# Patient Record
Sex: FEMALE | Race: WHITE | NOT HISPANIC OR LATINO | Employment: FULL TIME | URBAN - METROPOLITAN AREA
[De-identification: names, ages, dates, MRNs, and addresses within clinical notes are randomized per-mention and may not be internally consistent; named-entity substitution may affect disease eponyms.]

---

## 2017-09-30 ENCOUNTER — COMMUNICATION - HEALTHEAST (OUTPATIENT)
Dept: OBGYN | Facility: HOSPITAL | Age: 32
End: 2017-09-30

## 2017-10-02 ENCOUNTER — RECORDS - HEALTHEAST (OUTPATIENT)
Dept: ADMINISTRATIVE | Facility: OTHER | Age: 32
End: 2017-10-02

## 2017-10-02 ENCOUNTER — ANESTHESIA - HEALTHEAST (OUTPATIENT)
Dept: OBGYN | Facility: HOSPITAL | Age: 32
End: 2017-10-02

## 2018-01-22 ENCOUNTER — OFFICE VISIT (OUTPATIENT)
Dept: FAMILY MEDICINE | Facility: CLINIC | Age: 33
End: 2018-01-22
Payer: COMMERCIAL

## 2018-01-22 DIAGNOSIS — R50.9 FEVER, UNSPECIFIED FEVER CAUSE: Primary | ICD-10-CM

## 2018-01-22 DIAGNOSIS — B34.9 VIRAL SYNDROME: ICD-10-CM

## 2018-01-22 LAB
DEPRECATED S PYO AG THROAT QL EIA: NORMAL
FLUAV+FLUBV AG SPEC QL: NEGATIVE
FLUAV+FLUBV AG SPEC QL: NEGATIVE
SPECIMEN SOURCE: NORMAL
SPECIMEN SOURCE: NORMAL

## 2018-01-22 PROCEDURE — 99203 OFFICE O/P NEW LOW 30 MIN: CPT | Performed by: NURSE PRACTITIONER

## 2018-01-22 PROCEDURE — 87880 STREP A ASSAY W/OPTIC: CPT | Performed by: NURSE PRACTITIONER

## 2018-01-22 PROCEDURE — 87081 CULTURE SCREEN ONLY: CPT | Performed by: NURSE PRACTITIONER

## 2018-01-22 PROCEDURE — 87804 INFLUENZA ASSAY W/OPTIC: CPT | Performed by: NURSE PRACTITIONER

## 2018-01-22 ASSESSMENT — MIFFLIN-ST. JEOR: SCORE: 1852.13

## 2018-01-22 NOTE — LETTER
January 24, 2018      Shameka Maurice  [address] [address] [address]        Dear Shameka,         This letter is to inform you that the results of your recent throat culture are negative.  If you have any questions please call or make an appointment.          Sincerely,        ZAINAB Brown CNP

## 2018-01-22 NOTE — NURSING NOTE
"Chief Complaint   Patient presents with     Fever       Initial /79  Pulse 111  Temp 101.1  F (38.4  C) (Tympanic)  Wt 247 lb (112 kg)  SpO2 99%  BMI 39.87 kg/m2 Estimated body mass index is 39.87 kg/(m^2) as calculated from the following:    Height as of 5/3/14: 5' 6\" (1.676 m).    Weight as of this encounter: 247 lb (112 kg).  Medication Reconciliation: complete    Health Maintenance that is potentially due pending provider review:  NONE    n/a    Is there anyone who you would like to be able to receive your results? No  If yes have patient fill out CHRISTA      "

## 2018-01-22 NOTE — PATIENT INSTRUCTIONS
"Strep culture is pending will result in 48 hours.  If it is positive and change in treatment plan will contact you.      Symptomatic treatment with fluids, rest.  May use acetaminophen, ibuprofen prn.  RTC if any worsening symptoms or if not improving.   May return to work/school after 24 hours fever free.    Follow-up with your primary care provider next week and as needed.    Indications for emergent return to emergency department discussed with patient, who verbalized good understanding and agreement.  Patient understands the limitations of today's evaluation.         Viral Syndrome (Adult)  A viral illness may cause a number of symptoms. The symptoms depend on the part of the body that the virus affects. If it settles in your nose, throat, and lungs, it may cause cough, sore throat, congestion, and sometimes headache. If it settles in your stomach and intestinal tract, it may cause vomiting and diarrhea. Sometimes it causes vague symptoms like \"aching all over,\" feeling tired, loss of appetite, or fever.  A viral illness usually lasts 1 to 2 weeks, but sometimes it lasts longer. In some cases, a more serious infection can look like a viral syndrome in the first few days of the illness. You may need another exam and additional tests to know the difference. Watch for the warning signs listed below.  Home care  Follow these guidelines for taking care of yourself at home:    If symptoms are severe, rest at home for the first 2 to 3 days.    Stay away from cigarette smoke - both your smoke and the smoke from others.    You may use over-the-counter acetaminophen or ibuprofen for fever, muscle aching, and headache, unless another medicine was prescribed for this. If you have chronic liver or kidney disease or ever had a stomach ulcer or GI bleeding, talk with your doctor before using these medicines. No one who is younger than 18 and ill with a fever should take aspirin. It may cause severe disease or death.    Your " appetite may be poor, so a light diet is fine. Avoid dehydration by drinking 8 to 12 8-ounce glasses of fluids each day. This may include water; orange juice; lemonade; apple, grape, and cranberry juice; clear fruit drinks; electrolyte replacement and sports drinks; and decaffeinated teas and coffee. If you have been diagnosed with a kidney disease, ask your doctor how much and what types of fluids you should drink to prevent dehydration. If you have kidney disease, drinking too much fluid can cause it build up in the your body and be dangerous to your health.    Over-the-counter remedies won't shorten the length of the illness but may be helpful for cough, sore throat; and nasal and sinus congestion. Don't use decongestants if you have high blood pressure.  Follow-up care  Follow up with your healthcare provider if you do not improve over the next week.  Call 911  Get emergency medical care if any of the following occur:    Convulsion    Feeling weak, dizzy, or like you are going to faint    Chest pain, shortness of breath, wheezing, or difficulty breathing  When to seek medical advice  Call your healthcare provider right away if any of these occur:    Cough with lots of colored sputum (mucus) or blood in your sputum    Chest pain, shortness of breath, wheezing, or difficulty breathing    Severe headache; face, neck, or ear pain    Severe, constant pain in the lower right side of your belly (abdominal)    Continued vomiting (can t keep liquids down)    Frequent diarrhea (more than 5 times a day); blood (red or black color) or mucus in diarrhea    Feeling weak, dizzy, or like you are going to faint    Extreme thirst    Fever of 100.4 F (38 C) or higher, or as directed by your healthcare provider  Date Last Reviewed: 9/25/2015 2000-2017 [employer]. [address] [place] [address], [place] [address]. All rights reserved. This information is not intended as a substitute for professional medical care. Always  follow your healthcare professional's instructions.

## 2018-01-22 NOTE — PROGRESS NOTES
SUBJECTIVE:   Shameka Maurice is a 32 year old female who presents to clinic today for the following health issues:      Fever       Duration: today     Description (location/character/radiation): fever     Intensity:  moderate    Accompanying signs and symptoms: fever, chills, body aches, sore throat, vomiting, nausea, right ear pain, headache, no coughing or wheezing     History (similar episodes/previous evaluation): None    Precipitating or alleviating factors: None    Therapies tried and outcome: nyquil, dayquil      Problem list and histories reviewed & adjusted, as indicated.  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 No pertinent surgical history.    Social History   Substance Use Topics     Smoking status: Former Smoker     Packs/day: 0.25     Years: 15.00     Types: Cigarettes     Quit date: 8/13/2013     Smokeless tobacco: Never Used      Comment: 3-4 daily     Alcohol use Yes      Comment: occ.     Family History   Problem Relation Age of Onset     Breast Cancer Maternal Grandmother          Current Outpatient Prescriptions   Medication Sig Dispense Refill     clomiPHENE (CLOMID) 50 MG tablet Denied. Pt needs follow up appointment. (Patient not taking: Reported on 1/22/2018)       HYDROmorphone (DILAUDID) 2 MG tablet Take 1 tablet (2 mg) by mouth every 4 hours as needed for severe pain (Patient not taking: Reported on 1/22/2018) 6 tablet 0     ALPRAZolam (XANAX) 0.5 MG tablet Take 1 tablet by mouth twice daily as needed. (Patient not taking: Reported on 1/22/2018) 30 tablet 0     metFORMIN (GLUCOPHAGE) 500 MG tablet Take 1 tablet (500 mg) by mouth 3 times daily (with meals) (Patient not taking: Reported on 1/22/2018)  180 tablet 3     LORazepam (ATIVAN) 0.5 MG tablet Take 0.5-1 tablets (0.25-0.5 mg) by mouth every 8 hours as needed for anxiety Take 30 minutes prior to departure.  Do not operate a vehicle after taking this medication (Patient not taking: Reported on 1/22/2018) 4 tablet 0     citalopram (CELEXA) 20 MG tablet Take 1/2 tablet (10 mg) for 1-2 weeks, then increase to 1 tablet orally daily (Patient not taking: Reported on 1/22/2018) 30 tablet 4     Prenatal Vit-Fe Fumarate-FA (GNP PRENATAL VITAMINS) 28-0.8 MG TABS Take 1 tablet by mouth daily (Patient not taking: Reported on 1/22/2018) 120 tablet 4     Allergies   Allergen Reactions     Morphine Nausea and Vomiting     Septra [Bactrim] Rash         Reviewed and updated as needed this visit by clinical staffTobacco  Allergies  Meds  Med Hx  Surg Hx  Fam Hx  Soc Hx      Reviewed and updated as needed this visit by Provider         ROS:  Constitutional, HEENT, cardiovascular, pulmonary, gi and gu systems are negative, except as otherwise noted.      OBJECTIVE:   /79  Pulse 111  Temp 101.1  F (38.4  C) (Tympanic)  Wt 247 lb (112 kg)  SpO2 99%  BMI 39.87 kg/m2  Body mass index is 39.87 kg/(m^2).  GENERAL: healthy, alert and no distress  EYES: Eyes grossly normal to inspection, PERRL and conjunctivae and sclerae normal  HENT: ear canals and TM's normal, nose and mouth without ulcers or lesions  NECK: no adenopathy, no asymmetry, masses, or scars and thyroid normal to palpation  RESP: lungs clear to auscultation - no rales, rhonchi or wheezes  CV: regular rate and rhythm, normal S1 S2, no S3 or S4, no murmur, click or rub, no peripheral edema and peripheral pulses strong  MS: no gross musculoskeletal defects noted, no edema  SKIN: no suspicious lesions or rashes  NEURO: Normal strength and tone, mentation intact and speech normal  PSYCH: mentation appears normal, affect normal/bright  Results for orders placed or performed in visit on 01/22/18   Influenza A/B  "antigen   Result Value Ref Range    Influenza A/B Agn Specimen Nasal     Influenza A Negative NEG^Negative    Influenza B Negative NEG^Negative   Strep, Rapid Screen   Result Value Ref Range    Specimen Description Throat     Rapid Strep A Screen       NEGATIVE: No Group A streptococcal antigen detected by immunoassay, await culture report.         ASSESSMENT/PLAN:       ICD-10-CM    1. Fever, unspecified fever cause R50.9 Influenza A/B antigen     Strep, Rapid Screen     Beta strep group A culture   2. Viral syndrome B34.9      Patient Instructions   Strep culture is pending will result in 48 hours.  If it is positive and change in treatment plan will contact you.      Symptomatic treatment with fluids, rest.  May use acetaminophen, ibuprofen prn.  RTC if any worsening symptoms or if not improving.   May return to work/school after 24 hours fever free.    Follow-up with your primary care provider next week and as needed.    Indications for emergent return to emergency department discussed with patient, who verbalized good understanding and agreement.  Patient understands the limitations of today's evaluation.         Viral Syndrome (Adult)  A viral illness may cause a number of symptoms. The symptoms depend on the part of the body that the virus affects. If it settles in your nose, throat, and lungs, it may cause cough, sore throat, congestion, and sometimes headache. If it settles in your stomach and intestinal tract, it may cause vomiting and diarrhea. Sometimes it causes vague symptoms like \"aching all over,\" feeling tired, loss of appetite, or fever.  A viral illness usually lasts 1 to 2 weeks, but sometimes it lasts longer. In some cases, a more serious infection can look like a viral syndrome in the first few days of the illness. You may need another exam and additional tests to know the difference. Watch for the warning signs listed below.  Home care  Follow these guidelines for taking care of yourself at " home:    If symptoms are severe, rest at home for the first 2 to 3 days.    Stay away from cigarette smoke - both your smoke and the smoke from others.    You may use over-the-counter acetaminophen or ibuprofen for fever, muscle aching, and headache, unless another medicine was prescribed for this. If you have chronic liver or kidney disease or ever had a stomach ulcer or GI bleeding, talk with your doctor before using these medicines. No one who is younger than 18 and ill with a fever should take aspirin. It may cause severe disease or death.    Your appetite may be poor, so a light diet is fine. Avoid dehydration by drinking 8 to 12 8-ounce glasses of fluids each day. This may include water; orange juice; lemonade; apple, grape, and cranberry juice; clear fruit drinks; electrolyte replacement and sports drinks; and decaffeinated teas and coffee. If you have been diagnosed with a kidney disease, ask your doctor how much and what types of fluids you should drink to prevent dehydration. If you have kidney disease, drinking too much fluid can cause it build up in the your body and be dangerous to your health.    Over-the-counter remedies won't shorten the length of the illness but may be helpful for cough, sore throat; and nasal and sinus congestion. Don't use decongestants if you have high blood pressure.  Follow-up care  Follow up with your healthcare provider if you do not improve over the next week.  Call 911  Get emergency medical care if any of the following occur:    Convulsion    Feeling weak, dizzy, or like you are going to faint    Chest pain, shortness of breath, wheezing, or difficulty breathing  When to seek medical advice  Call your healthcare provider right away if any of these occur:    Cough with lots of colored sputum (mucus) or blood in your sputum    Chest pain, shortness of breath, wheezing, or difficulty breathing    Severe headache; face, neck, or ear pain    Severe, constant pain in the lower  right side of your belly (abdominal)    Continued vomiting (can t keep liquids down)    Frequent diarrhea (more than 5 times a day); blood (red or black color) or mucus in diarrhea    Feeling weak, dizzy, or like you are going to faint    Extreme thirst    Fever of 100.4 F (38 C) or higher, or as directed by your healthcare provider  Date Last Reviewed: 9/25/2015 2000-2017 [employer]. [address] [place] [address], [place] [address]. All rights reserved. This information is not intended as a substitute for professional medical care. Always follow your healthcare professional's instructions.            ZAINAB Brown [facility]

## 2018-01-22 NOTE — MR AVS SNAPSHOT
"              After Visit Summary   1/22/2018    Shameka Maurice    MRN: [id number]           Patient Information     Date Of Birth          1985        Visit Information        Provider Department      1/22/2018 4:40 PM Theresa Hoffman APRN [facility]        Today's Diagnoses     Fever, unspecified fever cause    -  1    Viral syndrome          Care Instructions    Strep culture is pending will result in 48 hours.  If it is positive and change in treatment plan will contact you.      Symptomatic treatment with fluids, rest.  May use acetaminophen, ibuprofen prn.  RTC if any worsening symptoms or if not improving.   May return to work/school after 24 hours fever free.    Follow-up with your primary care provider next week and as needed.    Indications for emergent return to emergency department discussed with patient, who verbalized good understanding and agreement.  Patient understands the limitations of today's evaluation.         Viral Syndrome (Adult)  A viral illness may cause a number of symptoms. The symptoms depend on the part of the body that the virus affects. If it settles in your nose, throat, and lungs, it may cause cough, sore throat, congestion, and sometimes headache. If it settles in your stomach and intestinal tract, it may cause vomiting and diarrhea. Sometimes it causes vague symptoms like \"aching all over,\" feeling tired, loss of appetite, or fever.  A viral illness usually lasts 1 to 2 weeks, but sometimes it lasts longer. In some cases, a more serious infection can look like a viral syndrome in the first few days of the illness. You may need another exam and additional tests to know the difference. Watch for the warning signs listed below.  Home care  Follow these guidelines for taking care of yourself at home:    If symptoms are severe, rest at home for the first 2 to 3 days.    Stay away from cigarette smoke - both your smoke and the smoke from " others.    You may use over-the-counter acetaminophen or ibuprofen for fever, muscle aching, and headache, unless another medicine was prescribed for this. If you have chronic liver or kidney disease or ever had a stomach ulcer or GI bleeding, talk with your doctor before using these medicines. No one who is younger than 18 and ill with a fever should take aspirin. It may cause severe disease or death.    Your appetite may be poor, so a light diet is fine. Avoid dehydration by drinking 8 to 12 8-ounce glasses of fluids each day. This may include water; orange juice; lemonade; apple, grape, and cranberry juice; clear fruit drinks; electrolyte replacement and sports drinks; and decaffeinated teas and coffee. If you have been diagnosed with a kidney disease, ask your doctor how much and what types of fluids you should drink to prevent dehydration. If you have kidney disease, drinking too much fluid can cause it build up in the your body and be dangerous to your health.    Over-the-counter remedies won't shorten the length of the illness but may be helpful for cough, sore throat; and nasal and sinus congestion. Don't use decongestants if you have high blood pressure.  Follow-up care  Follow up with your healthcare provider if you do not improve over the next week.  Call 911  Get emergency medical care if any of the following occur:    Convulsion    Feeling weak, dizzy, or like you are going to faint    Chest pain, shortness of breath, wheezing, or difficulty breathing  When to seek medical advice  Call your healthcare provider right away if any of these occur:    Cough with lots of colored sputum (mucus) or blood in your sputum    Chest pain, shortness of breath, wheezing, or difficulty breathing    Severe headache; face, neck, or ear pain    Severe, constant pain in the lower right side of your belly (abdominal)    Continued vomiting (can t keep liquids down)    Frequent diarrhea (more than 5 times a day); blood (red  "or black color) or mucus in diarrhea    Feeling weak, dizzy, or like you are going to faint    Extreme thirst    Fever of 100.4 F (38 C) or higher, or as directed by your healthcare provider  Date Last Reviewed: 2015-2017 [employer]. [address] [place] [address]. All rights reserved. This information is not intended as a substitute for professional medical care. Always follow your healthcare professional's instructions.                Follow-ups after your visit        Who to contact     If you have questions or need follow up information about today's clinic visit or your schedule please contact [facility] directly at [phone].  Normal or non-critical lab and imaging results will be communicated to you by MyChart, letter or phone within 4 business days after the clinic has received the results. If you do not hear from us within 7 days, please contact the clinic through "[employer]hart or phone. If you have a critical or abnormal lab result, we will notify you by phone as soon as possible.  Submit refill requests through [employer] or call your pharmacy and they will forward the refill request to us. Please allow 3 business days for your refill to be completed.          Additional Information About Your Visit        "[employer]hart Information     [employer] lets you send messages to your doctor, view your test results, renew your prescriptions, schedule appointments and more. To sign up, go to [email or web address] . Click on \"Log in\" on the left side of the screen, which will take you to the Welcome page. Then click on \"Sign up Now\" on the right side of the page.     You will be asked to enter the access code listed below, as well as some personal information. Please follow the directions to create your username and password.     Your access code is: [email or web address]  Expires: 2018  5:14 PM     Your access code will  in 90 days. If you need help or a new code, " please call your [facility] clinic or [phone].        Care EveryWhere ID     This is your Care EveryWhere ID. This could be used by other organizations to access your [facility] medical records  [id number]        Your Vitals Were     Pulse Temperature Pulse Oximetry BMI (Body Mass Index)          111 101.1  F (38.4  C) (Tympanic) 99% 39.87 kg/m2         Blood Pressure from Last 3 Encounters:   01/22/18 141/79   08/24/14 120/77   05/03/14 110/76    Weight from Last 3 Encounters:   01/22/18 247 lb (112 kg)   05/03/14 240 lb (108.9 kg)   02/06/14 245 lb 3.2 oz (111.2 kg)              We Performed the Following     Beta strep group A culture     Influenza A/B antigen     Strep, Rapid Screen        Primary Care Provider Fax #    Physician No Ref-Primary [phone]       No address on file        Equal Access to Services     [facility] : Hadii felicitas Ramírez, malina neal, jovany still, kate bauman . So [facility] [phone].    ATENCIÓN: Si habla español, tiene a sanchez disposición servicios gratuitos de asistencia lingüística. Janine al [phone].    We comply with applicable federal civil rights laws and [place] laws. We do not discriminate on the basis of race, color, national origin, age, disability, sex, sexual orientation, or gender identity.            Thank you!     Thank you for choosing [facility]  for your care. Our goal is always to provide you with excellent care. Hearing back from our patients is one way we can continue to improve our services. Please take a few minutes to complete the written survey that you may receive in the mail after your visit with us. Thank you!             Your Updated Medication List - Protect others around you: Learn how to safely use, store and throw away your medicines at www.disposemymeds.org.          This list is accurate as of: 1/22/18  5:14 PM.  Always use your most recent med list.                    Brand Name Dispense Instructions for use Diagnosis    ALPRAZolam 0.5 MG tablet    XANAX    30 tablet    Take 1 tablet by mouth twice daily as needed.    CHRIS (generalised anxiety disorder)       citalopram 20 MG tablet    celeXA    30 tablet    Take 1/2 tablet (10 mg) for 1-2 weeks, then increase to 1 tablet orally daily    CHRIS (generalised anxiety disorder)       clomiPHENE 50 MG tablet    CLOMID     Denied. Pt needs follow up appointment.    Primary anovulatory infertility       GNP PRENATAL VITAMINS 28-0.8 MG Tabs     120 tablet    Take 1 tablet by mouth daily    Infertility associated with anovulation       HYDROmorphone 2 MG tablet    DILAUDID    6 tablet    Take 1 tablet (2 mg) by mouth every 4 hours as needed for severe pain        LORazepam 0.5 MG tablet    ATIVAN    4 tablet    Take 0.5-1 tablets (0.25-0.5 mg) by mouth every 8 hours as needed for anxiety Take 30 minutes prior to departure.  Do not operate a vehicle after taking this medication    Anxiety, Primary anovulatory infertility       metFORMIN 500 MG tablet    GLUCOPHAGE    180 tablet    Take 1 tablet (500 mg) by mouth 3 times daily (with meals)    Infertility associated with anovulation, PCOS (polycystic ovarian syndrome)

## 2018-01-23 LAB
BACTERIA SPEC CULT: NORMAL
SPECIMEN SOURCE: NORMAL

## 2018-04-05 ENCOUNTER — TELEPHONE (OUTPATIENT)
Dept: FAMILY MEDICINE | Facility: CLINIC | Age: 33
End: 2018-04-05

## 2018-04-05 NOTE — TELEPHONE ENCOUNTER
Reviewing charts. Patient is due for a physical with pap smear.    Called and spoke with patient. She declines to schedule her pap smear right now.    Moira Giordano MA

## 2018-09-25 ENCOUNTER — TELEPHONE (OUTPATIENT)
Dept: FAMILY MEDICINE | Facility: CLINIC | Age: 33
End: 2018-09-25

## 2018-09-25 NOTE — TELEPHONE ENCOUNTER
Reviewing charts. Patient is due for a physical with pap smear.    Tried calling patient. Phone number is for someone named Erin. Letter mailed.    Moira Giordano MA

## 2018-09-25 NOTE — LETTER
[facility]  [address] [place] [id number]  [phone]          September 25, 2018    Shameka Maurice                                                                                                                     [address] [facility] [address]            Dear Shameka Maurice,    At [facility] we care about your health and well-being. A review of your chart has indicated that you are due for a pap and physical exam. Please contact us at [phone] to schedule your appointment.    If you have already had one or all of the above screening tests at another facility, please call us to update your chart.     You may contact the clinic at [phone] if you have any questions or concerns about this request.    Sincerely,    [facility] Care Staff/ ss

## 2018-12-11 ENCOUNTER — TELEPHONE (OUTPATIENT)
Dept: FAMILY MEDICINE | Facility: CLINIC | Age: 33
End: 2018-12-11

## 2018-12-11 NOTE — LETTER
[facility]  [address] [place] [address]  [phone]      December 11, 2018    Shameka Maurice                                                                                                                     [id number] [facility] [address]            Dear Shameka Maurice,    At [facility] we care about your health and well-being. A review of your chart has indicated that you are due for a pap and physical exam. Please contact us at [phone] to schedule your appointment.    If you have already had one or all of the above screening tests at another facility, please call us to update your chart.     You may contact the clinic at [phone] if you have any questions or concerns about this request.    Sincerely,    [facility] Care Staff/ ss

## 2018-12-11 NOTE — TELEPHONE ENCOUNTER
Panel Management Review      Patient has the following on her problem list:     Hypertension   Last three blood pressure readings:  BP Readings from Last 3 Encounters:   01/22/18 141/79   08/24/14 120/77   05/03/14 110/76     Blood pressure: FAILED    HTN Guidelines:  Age 18-59 BP range:  Less than 140/90  Age 60-85 with Diabetes:  Less than 140/90  Age 60-85 without Diabetes:  less than 150/90      Composite cancer screening  Chart review shows that this patient is due/due soon for the following Pap Smear  Summary:    Patient is due/failing the following:   BP CHECK and PAP    Action needed:   Patient needs office visit for a physical with pap smear.    Type of outreach:    Tried calling patient. The only number listed is for someone named Erin. Letter mailed.    Questions for provider review:    None                                                                                                                                    Moira Giordano MA

## 2019-07-01 VITALS
HEART RATE: 111 BPM | OXYGEN SATURATION: 99 % | SYSTOLIC BLOOD PRESSURE: 141 MMHG | BODY MASS INDEX: 39.7 KG/M2 | WEIGHT: 247 LBS | HEIGHT: 66 IN | TEMPERATURE: 101.1 F | DIASTOLIC BLOOD PRESSURE: 79 MMHG

## 2019-07-19 ENCOUNTER — OFFICE VISIT - HEALTHEAST (OUTPATIENT)
Dept: FAMILY MEDICINE | Facility: CLINIC | Age: 34
End: 2019-07-19

## 2019-07-19 DIAGNOSIS — Z72.0 TOBACCO ABUSE: ICD-10-CM

## 2019-07-19 DIAGNOSIS — N89.8 VAGINAL DISCHARGE: ICD-10-CM

## 2019-07-19 DIAGNOSIS — Z00.01 ENCOUNTER FOR GENERAL ADULT MEDICAL EXAMINATION WITH ABNORMAL FINDINGS: ICD-10-CM

## 2019-07-19 DIAGNOSIS — E66.01 OBESITY, CLASS III, BMI 40-49.9 (MORBID OBESITY) (H): ICD-10-CM

## 2019-07-19 DIAGNOSIS — Z12.4 ENCOUNTER FOR SCREENING FOR CERVICAL CANCER: ICD-10-CM

## 2019-07-19 LAB
CLUE CELLS: NORMAL
TRICHOMONAS, WET PREP: NORMAL
YEAST, WET PREP: NORMAL

## 2019-07-19 ASSESSMENT — MIFFLIN-ST. JEOR: SCORE: 1964.83

## 2019-07-22 ENCOUNTER — COMMUNICATION - HEALTHEAST (OUTPATIENT)
Dept: FAMILY MEDICINE | Facility: CLINIC | Age: 34
End: 2019-07-22

## 2019-07-22 LAB
C TRACH DNA SPEC QL PROBE+SIG AMP: NEGATIVE
HPV SOURCE: NORMAL
HUMAN PAPILLOMA VIRUS 16 DNA: NEGATIVE
HUMAN PAPILLOMA VIRUS 18 DNA: NEGATIVE
HUMAN PAPILLOMA VIRUS FINAL DIAGNOSIS: NORMAL
HUMAN PAPILLOMA VIRUS OTHER HR: NEGATIVE
N GONORRHOEA DNA SPEC QL NAA+PROBE: NEGATIVE
SPECIMEN DESCRIPTION: NORMAL

## 2019-07-29 LAB
BKR LAB AP ABNORMAL BLEEDING: NO
BKR LAB AP BIRTH CONTROL/HORMONES: NORMAL
BKR LAB AP CERVICAL APPEARANCE: NORMAL
BKR LAB AP GYN ADEQUACY: NORMAL
BKR LAB AP GYN INTERPRETATION: NORMAL
BKR LAB AP HPV REFLEX: NORMAL
BKR LAB AP LMP: NORMAL
BKR LAB AP PATIENT STATUS: NORMAL
BKR LAB AP PREVIOUS ABNORMAL: NO
BKR LAB AP PREVIOUS NORMAL: 2013
HIGH RISK?: NO
PATH REPORT.COMMENTS IMP SPEC: NORMAL
RESULT FLAG (HE HISTORICAL CONVERSION): NORMAL

## 2019-08-07 ENCOUNTER — COMMUNICATION - HEALTHEAST (OUTPATIENT)
Dept: FAMILY MEDICINE | Facility: CLINIC | Age: 34
End: 2019-08-07

## 2019-08-07 ENCOUNTER — OFFICE VISIT - HEALTHEAST (OUTPATIENT)
Dept: FAMILY MEDICINE | Facility: CLINIC | Age: 34
End: 2019-08-07

## 2019-08-07 ENCOUNTER — HOSPITAL ENCOUNTER (OUTPATIENT)
Dept: LAB | Age: 34
Setting detail: SPECIMEN
Discharge: HOME | End: 2019-08-07

## 2019-08-07 DIAGNOSIS — E66.01 OBESITY, CLASS III, BMI 40-49.9 (MORBID OBESITY) (H): ICD-10-CM

## 2019-08-07 DIAGNOSIS — F41.9 ANXIETY: ICD-10-CM

## 2019-08-07 DIAGNOSIS — F32.A DEPRESSION, UNSPECIFIED DEPRESSION TYPE: ICD-10-CM

## 2019-08-07 DIAGNOSIS — N92.6 IRREGULAR MENSES: ICD-10-CM

## 2019-08-07 LAB
ALBUMIN SERPL-MCNC: 3.9 G/DL (ref 3.5–5)
ALP SERPL-CCNC: 32 U/L (ref 45–120)
ALT SERPL W P-5'-P-CCNC: 19 U/L (ref 0–45)
ANION GAP SERPL CALCULATED.3IONS-SCNC: 12 MMOL/L (ref 5–18)
AST SERPL W P-5'-P-CCNC: 19 U/L (ref 0–40)
ATRIAL RATE - MUSE: 57 BPM
BILIRUB SERPL-MCNC: 0.2 MG/DL (ref 0–1)
BUN SERPL-MCNC: 14 MG/DL (ref 8–22)
CALCIUM SERPL-MCNC: 9.6 MG/DL (ref 8.5–10.5)
CHLORIDE BLD-SCNC: 104 MMOL/L (ref 98–107)
CHOLEST SERPL-MCNC: 198 MG/DL
CO2 SERPL-SCNC: 23 MMOL/L (ref 22–31)
CREAT SERPL-MCNC: 0.74 MG/DL (ref 0.6–1.1)
DIASTOLIC BLOOD PRESSURE - MUSE: NORMAL MMHG
ERYTHROCYTE [DISTWIDTH] IN BLOOD BY AUTOMATED COUNT: 12.9 % (ref 11–14.5)
FASTING STATUS PATIENT QL REPORTED: YES
GFR SERPL CREATININE-BSD FRML MDRD: >60 ML/MIN/1.73M2
GLUCOSE BLD-MCNC: 78 MG/DL (ref 70–125)
HBA1C MFR BLD: 5.6 % (ref 3.5–6)
HCT VFR BLD AUTO: 44.1 % (ref 35–47)
HDLC SERPL-MCNC: 45 MG/DL
HGB BLD-MCNC: 14.4 G/DL (ref 12–16)
INTERPRETATION ECG - MUSE: NORMAL
LDLC SERPL CALC-MCNC: 123 MG/DL
MCH RBC QN AUTO: 28 PG (ref 27–34)
MCHC RBC AUTO-ENTMCNC: 32.6 G/DL (ref 32–36)
MCV RBC AUTO: 86 FL (ref 80–100)
P AXIS - MUSE: 47 DEGREES
PLATELET # BLD AUTO: 308 THOU/UL (ref 140–440)
PMV BLD AUTO: 8.1 FL (ref 7–10)
POTASSIUM BLD-SCNC: 4.7 MMOL/L (ref 3.5–5)
PR INTERVAL - MUSE: 156 MS
PROT SERPL-MCNC: 7 G/DL (ref 6–8)
QRS DURATION - MUSE: 90 MS
QT - MUSE: 394 MS
QTC - MUSE: 383 MS
R AXIS - MUSE: 54 DEGREES
RBC # BLD AUTO: 5.14 MILL/UL (ref 3.8–5.4)
SODIUM SERPL-SCNC: 139 MMOL/L (ref 136–145)
SYSTOLIC BLOOD PRESSURE - MUSE: NORMAL MMHG
T AXIS - MUSE: 47 DEGREES
TRIGL SERPL-MCNC: 148 MG/DL
TSH SERPL DL<=0.005 MIU/L-ACNC: 2.09 UIU/ML (ref 0.3–5)
VENTRICULAR RATE- MUSE: 57 BPM
WBC: 6.9 THOU/UL (ref 4–11)

## 2019-08-07 ASSESSMENT — MIFFLIN-ST. JEOR: SCORE: 1969.82

## 2019-08-08 ENCOUNTER — COMMUNICATION - HEALTHEAST (OUTPATIENT)
Dept: SURGERY | Facility: CLINIC | Age: 34
End: 2019-08-08

## 2019-08-08 LAB
25(OH)D3 SERPL-MCNC: 22.5 NG/ML (ref 30–80)
INSULIN SERPL-ACNC: 11.7 MU/L (ref 3–25)

## 2019-08-09 ENCOUNTER — COMMUNICATION - HEALTHEAST (OUTPATIENT)
Dept: FAMILY MEDICINE | Facility: CLINIC | Age: 34
End: 2019-08-09

## 2019-08-19 ENCOUNTER — COMMUNICATION - HEALTHEAST (OUTPATIENT)
Dept: FAMILY MEDICINE | Facility: CLINIC | Age: 34
End: 2019-08-19

## 2019-08-19 DIAGNOSIS — E66.01 OBESITY, CLASS III, BMI 40-49.9 (MORBID OBESITY) (H): ICD-10-CM

## 2019-09-10 ENCOUNTER — OFFICE VISIT - HEALTHEAST (OUTPATIENT)
Dept: SURGERY | Facility: CLINIC | Age: 34
End: 2019-09-10

## 2019-09-10 ENCOUNTER — AMBULATORY - HEALTHEAST (OUTPATIENT)
Dept: SURGERY | Facility: CLINIC | Age: 34
End: 2019-09-10

## 2019-09-10 ENCOUNTER — AMBULATORY - HEALTHEAST (OUTPATIENT)
Dept: LAB | Facility: CLINIC | Age: 34
End: 2019-09-10

## 2019-09-10 DIAGNOSIS — E66.01 MORBID OBESITY (H): ICD-10-CM

## 2019-09-10 DIAGNOSIS — N97.0 PRIMARY ANOVULATORY INFERTILITY: ICD-10-CM

## 2019-09-10 LAB
ALBUMIN SERPL-MCNC: 3.8 G/DL (ref 3.5–5)
ALP SERPL-CCNC: 43 U/L (ref 45–120)
ALT SERPL W P-5'-P-CCNC: 13 U/L (ref 0–45)
ANION GAP SERPL CALCULATED.3IONS-SCNC: 11 MMOL/L (ref 5–18)
AST SERPL W P-5'-P-CCNC: 12 U/L (ref 0–40)
BILIRUB SERPL-MCNC: 0.2 MG/DL (ref 0–1)
BUN SERPL-MCNC: 15 MG/DL (ref 8–22)
CALCIUM SERPL-MCNC: 9.7 MG/DL (ref 8.5–10.5)
CHLORIDE BLD-SCNC: 105 MMOL/L (ref 98–107)
CO2 SERPL-SCNC: 24 MMOL/L (ref 22–31)
CREAT SERPL-MCNC: 0.77 MG/DL (ref 0.6–1.1)
ERYTHROCYTE [DISTWIDTH] IN BLOOD BY AUTOMATED COUNT: 12.7 % (ref 11–14.5)
FERRITIN SERPL-MCNC: 59 NG/ML (ref 10–130)
FOLATE SERPL-MCNC: 9.9 NG/ML
GFR SERPL CREATININE-BSD FRML MDRD: >60 ML/MIN/1.73M2
GLUCOSE BLD-MCNC: 76 MG/DL (ref 70–125)
HCT VFR BLD AUTO: 42.6 % (ref 35–47)
HGB BLD-MCNC: 14.6 G/DL (ref 12–16)
MCH RBC QN AUTO: 28.5 PG (ref 27–34)
MCHC RBC AUTO-ENTMCNC: 34.2 G/DL (ref 32–36)
MCV RBC AUTO: 83 FL (ref 80–100)
PLATELET # BLD AUTO: 348 THOU/UL (ref 140–440)
PMV BLD AUTO: 7.8 FL (ref 7–10)
POTASSIUM BLD-SCNC: 4.4 MMOL/L (ref 3.5–5)
PROT SERPL-MCNC: 7.2 G/DL (ref 6–8)
PTH-INTACT SERPL-MCNC: 106 PG/ML (ref 10–86)
RBC # BLD AUTO: 5.11 MILL/UL (ref 3.8–5.4)
SODIUM SERPL-SCNC: 140 MMOL/L (ref 136–145)
VIT B12 SERPL-MCNC: 469 PG/ML (ref 213–816)
WBC: 6.9 THOU/UL (ref 4–11)

## 2019-09-10 ASSESSMENT — MIFFLIN-ST. JEOR: SCORE: 1970.27

## 2019-09-13 LAB
ANNOTATION COMMENT IMP: NORMAL
VIT A SERPL-MCNC: 0.58 MG/L (ref 0.3–1.2)
VIT B1 PYROPHOSHATE BLD-SCNC: 128 NMOL/L (ref 70–180)
VITAMIN A (RETINYL PALMITATE): 0.05 MG/L (ref 0–0.1)
ZINC SERPL-MCNC: 63.5 UG/DL (ref 60–120)

## 2019-10-09 ENCOUNTER — OFFICE VISIT - HEALTHEAST (OUTPATIENT)
Dept: SURGERY | Facility: CLINIC | Age: 34
End: 2019-10-09

## 2019-10-09 DIAGNOSIS — Z71.3 NUTRITIONAL COUNSELING: ICD-10-CM

## 2019-10-09 DIAGNOSIS — E66.01 OBESITY, CLASS III, BMI 40-49.9 (MORBID OBESITY) (H): ICD-10-CM

## 2019-10-09 ASSESSMENT — MIFFLIN-ST. JEOR: SCORE: 1938.98

## 2019-10-23 ENCOUNTER — OFFICE VISIT - HEALTHEAST (OUTPATIENT)
Dept: SURGERY | Facility: CLINIC | Age: 34
End: 2019-10-23

## 2019-10-23 DIAGNOSIS — E66.01 MORBID OBESITY (H): ICD-10-CM

## 2019-11-04 ENCOUNTER — OFFICE VISIT - HEALTHEAST (OUTPATIENT)
Dept: SURGERY | Facility: CLINIC | Age: 34
End: 2019-11-04

## 2019-11-04 DIAGNOSIS — E66.01 MORBID OBESITY (H): ICD-10-CM

## 2019-11-07 ENCOUNTER — OFFICE VISIT - HEALTHEAST (OUTPATIENT)
Dept: SURGERY | Facility: CLINIC | Age: 34
End: 2019-11-07

## 2019-11-07 DIAGNOSIS — E66.01 OBESITY, CLASS III, BMI 40-49.9 (MORBID OBESITY) (H): ICD-10-CM

## 2019-11-07 DIAGNOSIS — Z71.3 NUTRITIONAL COUNSELING: ICD-10-CM

## 2019-11-07 ASSESSMENT — MIFFLIN-ST. JEOR: SCORE: 1912.21

## 2019-12-02 ENCOUNTER — COMMUNICATION - HEALTHEAST (OUTPATIENT)
Dept: SURGERY | Facility: CLINIC | Age: 34
End: 2019-12-02

## 2019-12-09 ENCOUNTER — OFFICE VISIT - HEALTHEAST (OUTPATIENT)
Dept: SURGERY | Facility: CLINIC | Age: 34
End: 2019-12-09

## 2019-12-09 DIAGNOSIS — E66.01 MORBID OBESITY (H): ICD-10-CM

## 2019-12-13 ENCOUNTER — OFFICE VISIT - HEALTHEAST (OUTPATIENT)
Dept: SURGERY | Facility: CLINIC | Age: 34
End: 2019-12-13

## 2019-12-13 DIAGNOSIS — E66.01 OBESITY, CLASS III, BMI 40-49.9 (MORBID OBESITY) (H): ICD-10-CM

## 2019-12-13 DIAGNOSIS — Z71.3 NUTRITIONAL COUNSELING: ICD-10-CM

## 2019-12-13 ASSESSMENT — MIFFLIN-ST. JEOR: SCORE: 1904.5

## 2019-12-19 ENCOUNTER — TELEPHONE (OUTPATIENT)
Dept: FAMILY MEDICINE | Facility: CLINIC | Age: 34
End: 2019-12-19

## 2019-12-19 NOTE — TELEPHONE ENCOUNTER
Panel Management Review      Patient has the following on her problem list: None      Composite cancer screening  Chart review shows that this patient is due/due soon for the following Pap Smear  Summary:    Patient is due/failing the following:   BP CHECK and PAP    Action needed:   Patient needs office visit for pap and bp check.    Type of outreach:    Phone, left message for patient to call back.  and Sent letter.    Questions for provider review:    None                                                                                                                                    Ondina Lowery RN     Chart routed to Care Team .

## 2019-12-19 NOTE — LETTER
[facility]  [address] [place] [id number]  Phone: [phone]  Fax: [phone]        December 19, 2019      Shameka Maurice                                                                                                                                [id number]   [facility] [address]            Dear Ms. Moreno Josafat,    We are concerned about your health care.     At this time we ask that: You schedule an appointment for your annual physical and pap smear.      Thank you,      Theresa Hoffman, ZEFERINO/ ss

## 2020-01-02 ENCOUNTER — OFFICE VISIT - HEALTHEAST (OUTPATIENT)
Dept: SURGERY | Facility: CLINIC | Age: 35
End: 2020-01-02

## 2020-01-02 DIAGNOSIS — Z71.3 NUTRITIONAL COUNSELING: ICD-10-CM

## 2020-01-02 DIAGNOSIS — E66.01 OBESITY, CLASS III, BMI 40-49.9 (MORBID OBESITY) (H): ICD-10-CM

## 2020-01-02 ASSESSMENT — MIFFLIN-ST. JEOR: SCORE: 1889.99

## 2020-01-13 ENCOUNTER — TELEPHONE (OUTPATIENT)
Dept: FAMILY MEDICINE | Facility: CLINIC | Age: 35
End: 2020-01-13

## 2020-01-13 NOTE — TELEPHONE ENCOUNTER
Panel Management Review      Patient has the following on her problem list:     Hypertension   Last three blood pressure readings:  BP Readings from Last 3 Encounters:   01/22/18 141/79   08/24/14 120/77   05/03/14 110/76     Blood pressure: FAILED    HTN Guidelines:  Less than 140/90      Composite cancer screening  Chart review shows that this patient is due/due soon for the following Pap Smear  Summary:    Patient is due/failing the following:   BP CHECK and PAP    Action needed:   Patient needs office visit for preventative care and BP check.    Type of outreach:    Phone, left message for patient to call back.  and Sent letter.    Questions for provider review:    None                                                                                                                                    Ondina Lowery RN     Chart routed to Care Team .

## 2020-01-13 NOTE — LETTER
[facility]  [address] [place] [address]  Phone: [phone]  Fax: [phone]    January 13, 2020      Shameka Maurice                                                                                                     [id number]   [facility] [address]            Dear Ms. Moreno Josafat,    We are concerned about your health care.  We recently provided you with a medication refill.  Many medications require routine follow-up with your Doctor.      At this time we ask that: You schedule an appointment for your annual physical.        Thank you,      Theresa Hoffman NP/ ss

## 2020-01-20 ENCOUNTER — AMBULATORY - HEALTHEAST (OUTPATIENT)
Dept: SURGERY | Facility: CLINIC | Age: 35
End: 2020-01-20

## 2020-02-04 ENCOUNTER — OFFICE VISIT - HEALTHEAST (OUTPATIENT)
Dept: SURGERY | Facility: CLINIC | Age: 35
End: 2020-02-04

## 2020-02-04 DIAGNOSIS — E66.01 OBESITY, CLASS III, BMI 40-49.9 (MORBID OBESITY) (H): ICD-10-CM

## 2020-02-04 DIAGNOSIS — Z71.3 NUTRITIONAL COUNSELING: ICD-10-CM

## 2020-02-04 ASSESSMENT — MIFFLIN-ST. JEOR: SCORE: 1863.22

## 2020-02-12 ENCOUNTER — OFFICE VISIT - HEALTHEAST (OUTPATIENT)
Dept: SURGERY | Facility: CLINIC | Age: 35
End: 2020-02-12

## 2020-02-12 DIAGNOSIS — E66.01 MORBID OBESITY (H): ICD-10-CM

## 2020-02-13 ENCOUNTER — AMBULATORY - HEALTHEAST (OUTPATIENT)
Dept: SURGERY | Facility: CLINIC | Age: 35
End: 2020-02-13

## 2020-02-26 ENCOUNTER — SURGERY - HEALTHEAST (OUTPATIENT)
Dept: SURGERY | Facility: CLINIC | Age: 35
End: 2020-02-26

## 2020-02-26 DIAGNOSIS — E66.01 MORBID OBESITY (H): ICD-10-CM

## 2020-03-04 ENCOUNTER — AMBULATORY - HEALTHEAST (OUTPATIENT)
Dept: SURGERY | Facility: CLINIC | Age: 35
End: 2020-03-04

## 2020-03-04 DIAGNOSIS — K21.9 ACID REFLUX: ICD-10-CM

## 2020-03-04 DIAGNOSIS — Z98.84 S/P LAPAROSCOPIC SLEEVE GASTRECTOMY: ICD-10-CM

## 2020-03-04 DIAGNOSIS — Z01.818 PRE-OPERATIVE CLEARANCE: ICD-10-CM

## 2020-03-04 DIAGNOSIS — Z87.891 HISTORY OF SMOKING: ICD-10-CM

## 2020-03-04 DIAGNOSIS — R63.4 RAPID WEIGHT LOSS: ICD-10-CM

## 2020-03-04 ASSESSMENT — MIFFLIN-ST. JEOR: SCORE: 1879.55

## 2020-03-06 ENCOUNTER — COMMUNICATION - HEALTHEAST (OUTPATIENT)
Dept: ADMINISTRATIVE | Facility: CLINIC | Age: 35
End: 2020-03-06

## 2020-03-06 ENCOUNTER — RECORDS - HEALTHEAST (OUTPATIENT)
Dept: ADMINISTRATIVE | Facility: OTHER | Age: 35
End: 2020-03-06

## 2020-03-17 ENCOUNTER — RECORDS - HEALTHEAST (OUTPATIENT)
Dept: ADMINISTRATIVE | Facility: OTHER | Age: 35
End: 2020-03-17

## 2020-05-14 ENCOUNTER — AMBULATORY - HEALTHEAST (OUTPATIENT)
Dept: SURGERY | Facility: CLINIC | Age: 35
End: 2020-05-14

## 2020-05-14 DIAGNOSIS — Z11.59 ENCOUNTER FOR SCREENING FOR OTHER VIRAL DISEASES: ICD-10-CM

## 2020-05-19 ENCOUNTER — AMBULATORY - HEALTHEAST (OUTPATIENT)
Dept: SURGERY | Facility: CLINIC | Age: 35
End: 2020-05-19

## 2020-05-19 DIAGNOSIS — Z98.84 S/P LAPAROSCOPIC SLEEVE GASTRECTOMY: ICD-10-CM

## 2020-05-19 DIAGNOSIS — K21.9 ACID REFLUX: ICD-10-CM

## 2020-05-19 DIAGNOSIS — R63.4 RAPID WEIGHT LOSS: ICD-10-CM

## 2020-05-19 DIAGNOSIS — Z87.891 HISTORY OF SMOKING: ICD-10-CM

## 2020-05-19 DIAGNOSIS — Z01.818 PRE-OPERATIVE CLEARANCE: ICD-10-CM

## 2020-05-21 ENCOUNTER — AMBULATORY - HEALTHEAST (OUTPATIENT)
Dept: LAB | Facility: CLINIC | Age: 35
End: 2020-05-21

## 2020-05-21 DIAGNOSIS — Z01.818 PRE-OPERATIVE CLEARANCE: ICD-10-CM

## 2020-05-21 DIAGNOSIS — Z87.891 HISTORY OF SMOKING: ICD-10-CM

## 2020-05-21 LAB
ALBUMIN SERPL-MCNC: 4 G/DL (ref 3.5–5)
ALBUMIN UR-MCNC: NEGATIVE MG/DL
ALP SERPL-CCNC: 28 U/L (ref 45–120)
ALT SERPL W P-5'-P-CCNC: 14 U/L (ref 0–45)
ANION GAP SERPL CALCULATED.3IONS-SCNC: 10 MMOL/L (ref 5–18)
APPEARANCE UR: CLEAR
APTT PPP: 28 SECONDS (ref 24–37)
AST SERPL W P-5'-P-CCNC: 15 U/L (ref 0–40)
BASOPHILS # BLD AUTO: 0.1 THOU/UL (ref 0–0.2)
BASOPHILS NFR BLD AUTO: 1 % (ref 0–2)
BILIRUB SERPL-MCNC: 0.4 MG/DL (ref 0–1)
BILIRUB UR QL STRIP: NEGATIVE
BUN SERPL-MCNC: 10 MG/DL (ref 8–22)
CALCIUM SERPL-MCNC: 9.6 MG/DL (ref 8.5–10.5)
CHLORIDE BLD-SCNC: 105 MMOL/L (ref 98–107)
CO2 SERPL-SCNC: 24 MMOL/L (ref 22–31)
COLOR UR AUTO: YELLOW
CREAT SERPL-MCNC: 0.77 MG/DL (ref 0.6–1.1)
EOSINOPHIL # BLD AUTO: 0.1 THOU/UL (ref 0–0.4)
EOSINOPHIL NFR BLD AUTO: 1 % (ref 0–6)
ERYTHROCYTE [DISTWIDTH] IN BLOOD BY AUTOMATED COUNT: 12.8 % (ref 11–14.5)
GFR SERPL CREATININE-BSD FRML MDRD: >60 ML/MIN/1.73M2
GLUCOSE BLD-MCNC: 82 MG/DL (ref 70–125)
GLUCOSE UR STRIP-MCNC: NEGATIVE MG/DL
HCT VFR BLD AUTO: 41.2 % (ref 35–47)
HGB BLD-MCNC: 14 G/DL (ref 12–16)
HGB UR QL STRIP: NEGATIVE
INR PPP: 1.03 (ref 0.9–1.1)
KETONES UR STRIP-MCNC: NEGATIVE MG/DL
LEUKOCYTE ESTERASE UR QL STRIP: NEGATIVE
LYMPHOCYTES # BLD AUTO: 2.8 THOU/UL (ref 0.8–4.4)
LYMPHOCYTES NFR BLD AUTO: 35 % (ref 20–40)
MCH RBC QN AUTO: 29.2 PG (ref 27–34)
MCHC RBC AUTO-ENTMCNC: 34 G/DL (ref 32–36)
MCV RBC AUTO: 86 FL (ref 80–100)
MONOCYTES # BLD AUTO: 0.4 THOU/UL (ref 0–0.9)
MONOCYTES NFR BLD AUTO: 5 % (ref 2–10)
NEUTROPHILS # BLD AUTO: 4.7 THOU/UL (ref 2–7.7)
NEUTROPHILS NFR BLD AUTO: 58 % (ref 50–70)
NITRATE UR QL: NEGATIVE
PH UR STRIP: 5 [PH] (ref 5–8)
PLATELET # BLD AUTO: 343 THOU/UL (ref 140–440)
PMV BLD AUTO: 8.3 FL (ref 7–10)
POTASSIUM BLD-SCNC: 4.7 MMOL/L (ref 3.5–5)
PROT SERPL-MCNC: 7.2 G/DL (ref 6–8)
RBC # BLD AUTO: 4.81 MILL/UL (ref 3.8–5.4)
SODIUM SERPL-SCNC: 139 MMOL/L (ref 136–145)
SP GR UR STRIP: >=1.03 (ref 1–1.03)
UROBILINOGEN UR STRIP-ACNC: NORMAL
WBC: 8.1 THOU/UL (ref 4–11)

## 2020-05-22 ENCOUNTER — RECORDS - HEALTHEAST (OUTPATIENT)
Dept: ADMINISTRATIVE | Facility: OTHER | Age: 35
End: 2020-05-22

## 2020-05-24 ENCOUNTER — AMBULATORY - HEALTHEAST (OUTPATIENT)
Dept: FAMILY MEDICINE | Facility: CLINIC | Age: 35
End: 2020-05-24

## 2020-05-24 DIAGNOSIS — Z11.59 ENCOUNTER FOR SCREENING FOR OTHER VIRAL DISEASES: ICD-10-CM

## 2020-05-25 LAB
ANABASINE: 3.8 NG/ML
COTININE: 427 NG/ML
NICOTINE: 344 NG/ML
NORNICOTINE: 28 NG/ML

## 2020-08-03 ENCOUNTER — COMMUNICATION - HEALTHEAST (OUTPATIENT)
Dept: SURGERY | Facility: CLINIC | Age: 35
End: 2020-08-03

## 2020-08-05 ENCOUNTER — COMMUNICATION - HEALTHEAST (OUTPATIENT)
Dept: SURGERY | Facility: CLINIC | Age: 35
End: 2020-08-05

## 2020-08-05 DIAGNOSIS — Z87.891 HISTORY OF SMOKING: ICD-10-CM

## 2020-08-06 ENCOUNTER — AMBULATORY - HEALTHEAST (OUTPATIENT)
Dept: LAB | Facility: CLINIC | Age: 35
End: 2020-08-06

## 2020-08-06 DIAGNOSIS — Z87.891 HISTORY OF SMOKING: ICD-10-CM

## 2020-08-10 LAB
ANABASINE: <2 NG/ML
COTININE: <5 NG/ML
NICOTINE: <5 NG/ML
NORNICOTINE: <2 NG/ML

## 2020-08-11 ENCOUNTER — AMBULATORY - HEALTHEAST (OUTPATIENT)
Dept: SURGERY | Facility: CLINIC | Age: 35
End: 2020-08-11

## 2020-08-12 ENCOUNTER — AMBULATORY - HEALTHEAST (OUTPATIENT)
Dept: SURGERY | Facility: CLINIC | Age: 35
End: 2020-08-12

## 2020-08-12 ENCOUNTER — SURGERY - HEALTHEAST (OUTPATIENT)
Dept: SURGERY | Facility: CLINIC | Age: 35
End: 2020-08-12

## 2020-08-12 DIAGNOSIS — E66.01 MORBID OBESITY (H): ICD-10-CM

## 2020-08-12 DIAGNOSIS — Z11.59 ENCOUNTER FOR SCREENING FOR OTHER VIRAL DISEASES: ICD-10-CM

## 2020-08-12 DIAGNOSIS — Z01.818 PREOPERATIVE CLEARANCE: ICD-10-CM

## 2020-08-12 DIAGNOSIS — K21.9 ACID REFLUX: ICD-10-CM

## 2020-08-12 DIAGNOSIS — R63.4 RAPID WEIGHT LOSS: ICD-10-CM

## 2020-08-12 DIAGNOSIS — Z98.84 S/P LAPAROSCOPIC SLEEVE GASTRECTOMY: ICD-10-CM

## 2020-09-09 ENCOUNTER — COMMUNICATION - HEALTHEAST (OUTPATIENT)
Dept: SURGERY | Facility: CLINIC | Age: 35
End: 2020-09-09

## 2020-09-10 ENCOUNTER — AMBULATORY - HEALTHEAST (OUTPATIENT)
Dept: LAB | Facility: CLINIC | Age: 35
End: 2020-09-10

## 2020-09-10 DIAGNOSIS — Z11.59 ENCOUNTER FOR SCREENING FOR OTHER VIRAL DISEASES: ICD-10-CM

## 2020-09-11 ENCOUNTER — COMMUNICATION - HEALTHEAST (OUTPATIENT)
Dept: SURGERY | Facility: CLINIC | Age: 35
End: 2020-09-11

## 2020-09-12 ENCOUNTER — COMMUNICATION - HEALTHEAST (OUTPATIENT)
Dept: SCHEDULING | Facility: CLINIC | Age: 35
End: 2020-09-12

## 2020-09-14 ENCOUNTER — ANESTHESIA - HEALTHEAST (OUTPATIENT)
Dept: SURGERY | Facility: CLINIC | Age: 35
End: 2020-09-14

## 2020-09-14 ENCOUNTER — SURGERY - HEALTHEAST (OUTPATIENT)
Dept: SURGERY | Facility: CLINIC | Age: 35
End: 2020-09-14

## 2020-09-14 ASSESSMENT — MIFFLIN-ST. JEOR
SCORE: 1744.62
SCORE: 1793.94

## 2020-09-15 ASSESSMENT — MIFFLIN-ST. JEOR: SCORE: 1792.7

## 2020-09-16 ENCOUNTER — AMBULATORY - HEALTHEAST (OUTPATIENT)
Dept: SURGERY | Facility: CLINIC | Age: 35
End: 2020-09-16

## 2020-09-16 DIAGNOSIS — Z98.84 S/P LAPAROSCOPIC SLEEVE GASTRECTOMY: ICD-10-CM

## 2020-09-16 DIAGNOSIS — R11.2 NAUSEA WITH VOMITING: ICD-10-CM

## 2020-09-17 ENCOUNTER — COMMUNICATION - HEALTHEAST (OUTPATIENT)
Dept: SURGERY | Facility: CLINIC | Age: 35
End: 2020-09-17

## 2020-09-17 DIAGNOSIS — Z98.84 S/P LAPAROSCOPIC SLEEVE GASTRECTOMY: ICD-10-CM

## 2020-09-17 DIAGNOSIS — K21.9 ACID REFLUX: ICD-10-CM

## 2020-09-21 ENCOUNTER — COMMUNICATION - HEALTHEAST (OUTPATIENT)
Dept: SURGERY | Facility: CLINIC | Age: 35
End: 2020-09-21

## 2020-09-29 ENCOUNTER — COMMUNICATION - HEALTHEAST (OUTPATIENT)
Dept: SURGERY | Facility: CLINIC | Age: 35
End: 2020-09-29

## 2020-09-30 ENCOUNTER — OFFICE VISIT - HEALTHEAST (OUTPATIENT)
Dept: SURGERY | Facility: CLINIC | Age: 35
End: 2020-09-30

## 2020-09-30 DIAGNOSIS — E66.01 MORBID OBESITY DUE TO EXCESS CALORIES (H): ICD-10-CM

## 2020-09-30 DIAGNOSIS — E66.01 MORBID OBESITY (H): ICD-10-CM

## 2020-10-14 ENCOUNTER — OFFICE VISIT - HEALTHEAST (OUTPATIENT)
Dept: SURGERY | Facility: CLINIC | Age: 35
End: 2020-10-14

## 2020-10-14 ENCOUNTER — AMBULATORY - HEALTHEAST (OUTPATIENT)
Dept: SURGERY | Facility: CLINIC | Age: 35
End: 2020-10-14

## 2020-10-14 ENCOUNTER — COMMUNICATION - HEALTHEAST (OUTPATIENT)
Dept: SURGERY | Facility: CLINIC | Age: 35
End: 2020-10-14

## 2020-10-14 DIAGNOSIS — Z98.84 BARIATRIC SURGERY STATUS: ICD-10-CM

## 2020-10-14 DIAGNOSIS — K21.9 ACID REFLUX: ICD-10-CM

## 2020-10-14 DIAGNOSIS — E66.01 MORBID OBESITY (H): ICD-10-CM

## 2020-10-14 DIAGNOSIS — Z98.84 S/P LAPAROSCOPIC SLEEVE GASTRECTOMY: ICD-10-CM

## 2020-10-14 DIAGNOSIS — E56.9 VITAMIN DEFICIENCY: ICD-10-CM

## 2020-10-14 DIAGNOSIS — Z71.3 NUTRITIONAL COUNSELING: ICD-10-CM

## 2020-10-22 ENCOUNTER — COMMUNICATION - HEALTHEAST (OUTPATIENT)
Dept: SURGERY | Facility: CLINIC | Age: 35
End: 2020-10-22

## 2020-11-04 ENCOUNTER — OFFICE VISIT - HEALTHEAST (OUTPATIENT)
Dept: SURGERY | Facility: CLINIC | Age: 35
End: 2020-11-04

## 2020-11-04 DIAGNOSIS — R11.0 NAUSEA: ICD-10-CM

## 2020-11-04 DIAGNOSIS — Z98.84 S/P LAPAROSCOPIC SLEEVE GASTRECTOMY: ICD-10-CM

## 2020-11-04 DIAGNOSIS — E86.0 MILD DEHYDRATION: ICD-10-CM

## 2020-11-04 DIAGNOSIS — R13.10 DYSPHAGIA, UNSPECIFIED TYPE: ICD-10-CM

## 2020-11-04 ASSESSMENT — MIFFLIN-ST. JEOR: SCORE: 1626.68

## 2020-11-09 ENCOUNTER — SURGERY - HEALTHEAST (OUTPATIENT)
Dept: SURGERY | Facility: CLINIC | Age: 35
End: 2020-11-09

## 2020-11-09 ENCOUNTER — COMMUNICATION - HEALTHEAST (OUTPATIENT)
Dept: LAB | Facility: CLINIC | Age: 35
End: 2020-11-09

## 2020-11-09 DIAGNOSIS — R13.10 DYSPHAGIA: ICD-10-CM

## 2020-11-09 DIAGNOSIS — R11.0 NAUSEA: ICD-10-CM

## 2020-11-10 ENCOUNTER — AMBULATORY - HEALTHEAST (OUTPATIENT)
Dept: SURGERY | Facility: AMBULATORY SURGERY CENTER | Age: 35
End: 2020-11-10

## 2020-11-10 DIAGNOSIS — Z11.59 ENCOUNTER FOR SCREENING FOR OTHER VIRAL DISEASES: ICD-10-CM

## 2020-11-11 ENCOUNTER — AMBULATORY - HEALTHEAST (OUTPATIENT)
Dept: LAB | Facility: CLINIC | Age: 35
End: 2020-11-11

## 2020-11-11 DIAGNOSIS — Z11.59 ENCOUNTER FOR SCREENING FOR OTHER VIRAL DISEASES: ICD-10-CM

## 2020-11-12 ENCOUNTER — COMMUNICATION - HEALTHEAST (OUTPATIENT)
Dept: SCHEDULING | Facility: CLINIC | Age: 35
End: 2020-11-12

## 2020-11-12 ENCOUNTER — ANESTHESIA - HEALTHEAST (OUTPATIENT)
Dept: SURGERY | Facility: AMBULATORY SURGERY CENTER | Age: 35
End: 2020-11-12

## 2020-11-12 LAB
SARS-COV-2 PCR COMMENT: NORMAL
SARS-COV-2 RNA SPEC QL NAA+PROBE: NEGATIVE
SARS-COV-2 VIRUS SPECIMEN SOURCE: NORMAL

## 2020-11-12 ASSESSMENT — MIFFLIN-ST. JEOR: SCORE: 1604

## 2020-11-13 ENCOUNTER — SURGERY - HEALTHEAST (OUTPATIENT)
Dept: SURGERY | Facility: AMBULATORY SURGERY CENTER | Age: 35
End: 2020-11-13

## 2020-11-13 ASSESSMENT — MIFFLIN-ST. JEOR: SCORE: 1599.47

## 2020-11-16 ENCOUNTER — HOSPITAL ENCOUNTER (OUTPATIENT)
Dept: ULTRASOUND IMAGING | Facility: CLINIC | Age: 35
Discharge: HOME | End: 2020-11-16
Attending: SURGERY

## 2020-11-16 ENCOUNTER — HOSPITAL ENCOUNTER (OUTPATIENT)
Dept: RADIOLOGY | Facility: CLINIC | Age: 35
Discharge: HOME | End: 2020-11-16
Attending: SURGERY

## 2020-11-16 DIAGNOSIS — E66.01 MORBID OBESITY DUE TO EXCESS CALORIES (H): ICD-10-CM

## 2020-11-16 DIAGNOSIS — Z98.84 S/P LAPAROSCOPIC SLEEVE GASTRECTOMY: ICD-10-CM

## 2020-11-16 DIAGNOSIS — R11.0 NAUSEA: ICD-10-CM

## 2020-11-19 ENCOUNTER — HOSPITAL ENCOUNTER (OUTPATIENT)
Dept: RADIOLOGY | Facility: CLINIC | Age: 35
Discharge: HOME | End: 2020-11-19
Attending: SURGERY

## 2020-11-20 ENCOUNTER — SURGERY - HEALTHEAST (OUTPATIENT)
Dept: SURGERY | Facility: CLINIC | Age: 35
End: 2020-11-20

## 2020-11-20 ENCOUNTER — COMMUNICATION - HEALTHEAST (OUTPATIENT)
Dept: SURGERY | Facility: CLINIC | Age: 35
End: 2020-11-20

## 2020-11-20 DIAGNOSIS — Z90.3 HISTORY OF SLEEVE GASTRECTOMY: ICD-10-CM

## 2020-11-23 ENCOUNTER — AMBULATORY - HEALTHEAST (OUTPATIENT)
Dept: SURGERY | Facility: AMBULATORY SURGERY CENTER | Age: 35
End: 2020-11-23

## 2020-11-23 ENCOUNTER — AMBULATORY - HEALTHEAST (OUTPATIENT)
Dept: LAB | Facility: CLINIC | Age: 35
End: 2020-11-23

## 2020-11-23 DIAGNOSIS — Z11.59 ENCOUNTER FOR SCREENING FOR OTHER VIRAL DISEASES: ICD-10-CM

## 2020-11-23 ASSESSMENT — MIFFLIN-ST. JEOR: SCORE: 1588.12

## 2020-11-24 ENCOUNTER — ANESTHESIA - HEALTHEAST (OUTPATIENT)
Dept: SURGERY | Facility: AMBULATORY SURGERY CENTER | Age: 35
End: 2020-11-24

## 2020-11-25 ENCOUNTER — COMMUNICATION - HEALTHEAST (OUTPATIENT)
Dept: SCHEDULING | Facility: CLINIC | Age: 35
End: 2020-11-25

## 2020-11-25 ENCOUNTER — SURGERY - HEALTHEAST (OUTPATIENT)
Dept: SURGERY | Facility: AMBULATORY SURGERY CENTER | Age: 35
End: 2020-11-25

## 2020-11-25 ASSESSMENT — MIFFLIN-ST. JEOR: SCORE: 1588.12

## 2020-12-15 ENCOUNTER — COMMUNICATION - HEALTHEAST (OUTPATIENT)
Dept: SURGERY | Facility: CLINIC | Age: 35
End: 2020-12-15

## 2020-12-15 ENCOUNTER — OFFICE VISIT - HEALTHEAST (OUTPATIENT)
Dept: SURGERY | Facility: CLINIC | Age: 35
End: 2020-12-15

## 2020-12-15 DIAGNOSIS — Z98.84 BARIATRIC SURGERY STATUS: ICD-10-CM

## 2020-12-15 DIAGNOSIS — E66.811 OBESITY, CLASS I, BMI 30.0-34.9 (SEE ACTUAL BMI): ICD-10-CM

## 2020-12-15 DIAGNOSIS — Z71.3 NUTRITIONAL COUNSELING: ICD-10-CM

## 2020-12-15 ASSESSMENT — MIFFLIN-ST. JEOR: SCORE: 1568.16

## 2021-01-18 ENCOUNTER — COMMUNICATION - HEALTHEAST (OUTPATIENT)
Dept: SURGERY | Facility: CLINIC | Age: 36
End: 2021-01-18

## 2021-01-18 DIAGNOSIS — K21.9 ACID REFLUX: ICD-10-CM

## 2021-01-18 DIAGNOSIS — Z98.84 BARIATRIC SURGERY STATUS: ICD-10-CM

## 2021-03-04 ENCOUNTER — COMMUNICATION - HEALTHEAST (OUTPATIENT)
Dept: SURGERY | Facility: CLINIC | Age: 36
End: 2021-03-04

## 2021-03-04 DIAGNOSIS — Z98.84 S/P LAPAROSCOPIC SLEEVE GASTRECTOMY: ICD-10-CM

## 2021-03-04 DIAGNOSIS — K91.2 POSTGASTRECTOMY MALABSORPTION: ICD-10-CM

## 2021-03-04 DIAGNOSIS — E56.9 VITAMIN DEFICIENCY: ICD-10-CM

## 2021-03-04 DIAGNOSIS — Z90.3 POSTGASTRECTOMY MALABSORPTION: ICD-10-CM

## 2021-04-06 ENCOUNTER — OFFICE VISIT - HEALTHEAST (OUTPATIENT)
Dept: SURGERY | Facility: CLINIC | Age: 36
End: 2021-04-06

## 2021-04-06 DIAGNOSIS — K91.2 POSTGASTRECTOMY MALABSORPTION: ICD-10-CM

## 2021-04-06 DIAGNOSIS — R11.0 NAUSEA: ICD-10-CM

## 2021-04-06 DIAGNOSIS — R79.89 ABNORMAL TSH: ICD-10-CM

## 2021-04-06 DIAGNOSIS — Z90.3 POSTGASTRECTOMY MALABSORPTION: ICD-10-CM

## 2021-04-06 ASSESSMENT — MIFFLIN-ST. JEOR: SCORE: 1388.54

## 2021-04-12 ENCOUNTER — COMMUNICATION - HEALTHEAST (OUTPATIENT)
Dept: SURGERY | Facility: CLINIC | Age: 36
End: 2021-04-12

## 2021-04-14 ENCOUNTER — OFFICE VISIT - HEALTHEAST (OUTPATIENT)
Dept: SURGERY | Facility: CLINIC | Age: 36
End: 2021-04-14

## 2021-04-14 DIAGNOSIS — Z98.84 BARIATRIC SURGERY STATUS: ICD-10-CM

## 2021-04-14 DIAGNOSIS — Z71.3 NUTRITIONAL COUNSELING: ICD-10-CM

## 2021-04-14 DIAGNOSIS — E66.3 OVERWEIGHT: ICD-10-CM

## 2021-04-14 ASSESSMENT — MIFFLIN-ST. JEOR: SCORE: 1433.9

## 2021-05-14 ENCOUNTER — OFFICE VISIT - HEALTHEAST (OUTPATIENT)
Dept: SURGERY | Facility: CLINIC | Age: 36
End: 2021-05-14

## 2021-05-14 DIAGNOSIS — Z71.3 NUTRITIONAL COUNSELING: ICD-10-CM

## 2021-05-14 DIAGNOSIS — Z98.84 BARIATRIC SURGERY STATUS: ICD-10-CM

## 2021-05-14 ASSESSMENT — MIFFLIN-ST. JEOR: SCORE: 1415.75

## 2021-05-27 VITALS — WEIGHT: 153 LBS | HEIGHT: 66 IN | BODY MASS INDEX: 24.59 KG/M2

## 2021-05-30 NOTE — PROGRESS NOTES
FEMALE PREVENTATIVE EXAM    Assessment and Plan:       1. Encounter for general adult medical examination with abnormal findings    2. Vaginal discharge  I reviewed today's negative wet prep results with patient.  Will inform her of GC/chlamydia results, treat if needed.  Discussed that sometimes women may have normal discharge.  - Wet Prep, Vaginal  - Chlamydia trachomatis & Neisseria gonorrhoeae, Amplified Detection    3. Tobacco abuse  Counseled patient regarding smoking cessation, will start Chantix, as patient has uses previously and done well.  - varenicline (CHANTIX STARTING MONTH BOX) 0.5 mg (11)- 1 mg (42) tablet; 1 wk before you stop smoking take 0.5mg daily on days 1-3, 0.5mg 2 times each day on days 4-7, then 1mg 2 times daily.  Dispense: 53 tablet; Refill: 0  - varenicline (CHANTIX) 1 mg tablet; Take 1 tab by mouth two times a day. Take after eating with a full glass of water. NOTE: Dispense as maintenance for refills only.  Dispense: 60 tablet; Refill: 2    4. Obesity, Class III, BMI 40-49.9 (morbid obesity) (H)  Discussed with patient that we can schedule a weight loss intake appointment with me sometime in the next 1 to 2 weeks.  I will hold onto her paperwork.  In the meantime, she is not fasting today, though she will return to clinic within the next 1 to 2 weeks to do labs prior to visit so that we will have lab results to review at visit.  - Comprehensive Metabolic Panel; Future  - Glycosylated Hemoglobin A1c; Future  - HM2(CBC w/o Differential); Future  - Insulin Assay; Future  - Lipid Profile; Future  - Thyroid Cascade; Future  - Vitamin D, Total (25-Hydroxy); Future    5. Encounter for screening for cervical cancer   Collected today, will inform her of results  - Gynecologic Cytology (PAP Smear)     Next follow up:  Return in about 2 weeks (around 8/2/2019) for Weight Loss Follow Up; return for fasting labs prior to weight loss visit.    Immunization Review  Adult Imm Review: No immunizations  "due today  BMI: discussed, see above  Documented tobacco use.  Website and phone contact for QuitPlan given to patient in AVS.    I discussed the following with the patient:   Adult Healthy Living: Importance of regular exercise  Healthy nutrition  Weight loss referral options        Subjective:   Chief Complaint: Shameka Maurice is an 33 y.o. 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  She actually was planning to have a weight loss intake with me, however appointment was scheduled incorrectly, and she is okay with doing general physical today. Has additional concerns today, patient was made aware of possible split billing.       HPI: Patient has had vaginal discharge which she feels is more than normal over the past year.  Denies any vaginal itching or odor.  Would like to have gonorrhea and chlamydia screening done.  Does not want remaining STI screening done such as HIV, hep b, syphilis.      Healthy Habits  Are you taking a daily aspirin? No  Do you typically exercising at least 40 min, 3-4 times per week?  Yes \"I try\"  Do you usually eat at least 4 servings of fruit and vegetables a day, include whole grains and fiber and avoid regularly eating high fat foods? Yes  Have you had an eye exam in the past two years? NO  Do you see a dentist twice per year? Yes  Do you have any concerns regarding sleep? No    Safety Screen  If you own firearms, are they secured in a locked gun cabinet or with trigger locks? The patient does not own any firearms  Do you feel you are safe where you are living?: Yes (7/19/2019  1:38 PM)  Do you feel you are safe in your relationship(s)?: Yes (7/19/2019  1:38 PM)      Review of Systems:  Please see above.  The rest of the review of systems are negative for all systems.     Pap History:   No - age 30-65 PAP every 5 years recommended  Cancer Screening       Status Date      PAP SMEAR Overdue 8/6/2018      Done 8/6/2013      Patient has more history with " "this topic...          Patient Care Team:  Provider, No Primary Care as PCP - General        History     Reviewed By Date/Time Sections Reviewed    Kriss Mills MD 7/19/2019  1:56 PM Social Documentation    Kriss Mills MD 7/19/2019  1:54 PM Medical, Surgical, Family    Ariel Crowell 7/19/2019  1:39 PM Tobacco            Objective:   Vital Signs:   Visit Vitals  /78 (Patient Site: Right Arm, Patient Position: Sitting, Cuff Size: Adult Large)   Pulse 96   Resp 20   Ht 5' 5.5\" (1.664 m)   Wt (!) 275 lb 12.8 oz (125.1 kg)   LMP 06/17/2019   BMI 45.20 kg/m           PHYSICAL EXAM  General Appearance: Alert, cooperative, no distress, appears stated age  Head: Normocephalic, without obvious abnormality, atraumatic  Eyes: PERRL, conjunctiva/corneas clear, EOM's intact  Ears: Normal TM's and external ear canals, both ears  Nose: Nares normal, septum midline,mucosa normal, no drainage  Throat: Lips, mucosa, and tongue normal; teeth and gums normal  Neck: Supple, symmetrical, trachea midline, no adenopathy;  thyroid: not enlarged, symmetric, no tenderness/mass/nodules;   Back: Symmetric, no curvature, ROM normal, no CVA tenderness  Lungs: Clear to auscultation bilaterally, respirations unlabored  Breasts: No breast masses, tenderness, asymmetry, or nipple discharge.  Heart: Regular rate and rhythm, no murmur.   Abdomen: Soft, non-tender, bowel sounds active all four quadrants,  no masses, no organomegaly  Pelvic:Normally developed genitalia with no external lesions or eruptions. Vagina and cervix show no lesions, inflammation, discharge or tenderness. No cystocele, No rectocele. Uterus normal, though exam limited due to body habitus.  No adnexal mass or tenderness.  Extremities: Extremities normal, atraumatic, no cyanosis or edema  Skin: Skin color, texture, turgor normal, no rashes or lesions  Lymph nodes: Cervical, supraclavicular, and axillary nodes normal  Neurologic: Alert.   Psych: Normal affect.  Does not " appear anxious or depressed.                Additional Screenings Completed Today:

## 2021-05-31 NOTE — TELEPHONE ENCOUNTER
Patient Returning Call  Reason for call:  Patient is returning the call to Dr. Mills. The following message was found and given;  Hi Shameka, here are your recent test results which are all ok other than the low vitamin D.  I did leave a voicemail for you to call back as well.  I see you have an appointment with bariatric surgery, but if you did want to start phentermine in the meantime, like we discussed, please call me at [phone] to discuss if you haven't already done so.      Please call with questions or contact us using [employer].    Sincerely,        Electronically signed by Kriss Mills MD    Information relayed to patient:  Patient is requesting to start on the Phentermine as discussed and requested this to be sent in to the [facility] in [place]  Patient has additional questions:  Yes  If YES, what are your questions/concerns:  Please return call to patient with plan.  Okay to leave a detailed message?: Yes

## 2021-05-31 NOTE — PATIENT INSTRUCTIONS - HE
Goal over next few months: eat between 10am and 6pm     Carbohydrates: aim for 75-100grams per day    Sudarshan: ERCOM! Sudarshan    Record 4 days a week for the next month     The Obesity Code by Dr. Brock Morton    Goal of 7-8 hours of sleep per night.

## 2021-05-31 NOTE — TELEPHONE ENCOUNTER
Spoke with patient, counseled patient regarding phentermine use.  Informed her that it is a category ask medication, and that she needs to use reliable form of birth control while taking medication.  Also discussed with patient possible side effects of medication, such as dry mouth, increased blood pressure, palpitations, jitteriness, glaucoma, etc.  Patient was given 1 month prescription of phentermine 15 mg #30 tabs with no refills.  If she does need refills, she will need to come in for office visit.  Patient understands, agrees with plan.

## 2021-05-31 NOTE — PROGRESS NOTES
PATIENT INTAKE      ASSESSMENT:    1. Obesity, Class III, BMI 40-49.9 (morbid obesity) (H)  Ambulatory referral to Bariatric Care: Surgical and Non-Surgical    Electrocardiogram Perform and Read    Comprehensive Metabolic Panel    Glycosylated Hemoglobin A1c    HM2(CBC w/o Differential)    Insulin Assay    Lipid Profile    Thyroid Cascade    Vitamin D, Total (25-Hydroxy)   2. Irregular menses     3. Anxiety     4. Depression, unspecified depression type           PLAN    We discussed obesity as a disease, an approach to treatment and metabolic factors.  Nutrition counseling reviewed.    Labs ordered and will review and discuss with the patient.    Body composition analyzer done and results reviewed with the patient.  Please see scanned results in chart.    Discussed with the patient that phentermine is a pregnancy category X medication and that is is essential that a reliable form of birth control be used while taking this medication if the patient will be sexually active.  She is not currently sexually active, though if she were to become sexually active, she understands the need to use condoms.    Once I have reviewed all of patient's lab results, I will call patient and can discuss risks and benefits of use of phentermine.      Recommend journaling and tracking food intake using either an online program such as myfitnesspal.com or loseit.com or tracking using a paper and pencil.  Advised paying particular attention to total carbohydrates, fiber, protein, calories, and fats, and added sugars.     Recommend increasing movement throughout the day--sitting less, moving more.  Will increase activity over time to reach a goal of at least 150 minutes of moderate exercise each week.    Behavior modification:  Cognitive restructuring exercises, journaling stressors, triggers for food cravings.    Dietary Intervention:  Reduced calorie, reduced carbohydrates, whole food diet.    Near the end of the visit, after I had  "discussed expectations about weight loss with medical management alone, and I did discuss with patient that some of my patients do have surgical weight management, patient states that she is actually interested in possibly pursuing surgical weight management now, and order was placed.    Greater than 50% of this 50 minute visit was spent in counseling regarding obesity is a disease as well as the nutritional and exercise recommendations of our program as it pertains to the patient's own individual healthcare needs.    Patient instructed to follow up in 1 month.      This note has been dictated using voice recognition software. Any grammatical or context distortions are unintentional and inherent to the software      SUBJECTIVE:      Patient presents for treatment of chronic, comorbid conditions using intensive therapeutic lifestyle interventions including nutrition, physical activity, and behavior management.    She has been overweight since age 7.  As a child, if she was bored, would eat, chips, cookies, candy. Has tried multiple diets including Slimgenics, Weight Watchers, Beach Body, Tripbod diet, and has little weight loss--at most 5-10 pounds.  She was 185lb until age 20, then continued to eat unhealthy, gained weight over time.  Have tried various diets over the last 10 years as noted above.  Has tried OTC \"diet\" pills, and Pipo, not helpful.  Would be on diets for a few months at a time.  She was on Slimgenics most recently, lost 45 pounds down to 240lb, then started gaining weight even when on slimgenics.  Trying to eat healthier: eggs for breakfast, meat and vegetables for lunch and dinner.  Eating breakfast 6am, lunch around noon if she eats it, dinner aorund 5:30pm.  Occasionally eats snacks, after lunch or after dinner--usually snacks will be ice cream or cookie.      Sleep: Goes to bed at 9pm, wakes up 6am.  Usually sleeps well.  Snores at night.      Exercise: Walks 5 miles a day at work, brings " dogs for walk.    Stress: I have some stress, but not feeling overly stressed.     What would you like to weigh (goal weight):  150lb  At what age were you last at that weight:  In her teens  Any previous prescription weight loss medication:  No, but has used Pipo OTC previously  Menses regular:  irregular  Number of children:  1  Are you pregnant: no  Are you currently using contraception: no, but states she has had intercourse one time since her 2 year old son was born  Do you exercise regularly:  Yes  Any problems with exercise:  no  Do you eat nutritiously?  Somewhat, tries to  Do you eat excessively?  no  Do you count calories?  Have previously, though not currently  Do you snore at night or ever stop breathing? yes  Have you been overweight all your life?  yes  If not, how long?  n/a  Do you have a history of eating disorder?  No  Have you ever had or been treated for alcohol or other substance abuse/dependence:   No    Patient Active Problem List   Diagnosis   (none) - all problems resolved or deleted       Past Medical History:   Diagnosis Date     Anxiety      History of UTI      Kidney infection      Miscarriage      Ovarian cyst      Tubal pregnancy        Past Surgical History:   Procedure Laterality Date     APPENDECTOMY       DIAGNOSTIC LAPAROSCOPY Right 9/3/2014    Procedure: Laparoscopy with Right Salpingectomy;  Surgeon: Dallas Laird MD;  Location: [facility];  Service:        Current Outpatient Medications on File Prior to Visit   Medication Sig Dispense Refill     cyclobenzaprine (FLEXERIL) 10 MG tablet Take 1 tablet (10 mg total) by mouth 3 (three) times a day as needed for muscle spasms. 30 tablet 0     FLUoxetine (PROZAC) 20 MG capsule Take 20 mg by mouth daily.       ibuprofen (ADVIL,MOTRIN) 800 MG tablet Take 1 tablet (800 mg total) by mouth every 8 (eight) hours as needed for pain. 30 tablet 0     LORazepam (ATIVAN) 0.5 MG tablet Take 0.5 mg by mouth as needed.  1      varenicline (CHANTIX STARTING MONTH BOX) 0.5 mg (11)- 1 mg (42) tablet 1 wk before you stop smoking take 0.5mg daily on days 1-3, 0.5mg 2 times each day on days 4-7, then 1mg 2 times daily. 53 tablet 0     varenicline (CHANTIX) 1 mg tablet Take 1 tab by mouth two times a day. Take after eating with a full glass of water. NOTE: Dispense as maintenance for refills only. 60 tablet 2     [DISCONTINUED] bismuth subsalicylate (PEPTO BISMOL) 262 mg/15 mL suspension Take 15 mL by mouth every 6 (six) hours as needed for indigestion.       [DISCONTINUED] citalopram (CELEXA) 20 MG tablet Take 40 mg by mouth daily.        [DISCONTINUED] prenatal vitamin iron-folic acid 27mg-0.8mg (PRENATAL S) 27 mg iron- 800 mcg Tab tablet Take 1 tablet by mouth daily.       No current facility-administered medications on file prior to visit.        Allergies   Allergen Reactions     Morphine Nausea And Vomiting     Septra [Sulfamethoxazole-Trimethoprim] Hives         Family History   Problem Relation Age of Onset     Breast cancer Mother      No Medical Problems Father      Breast cancer Maternal Grandmother      Family History also positive for  Obesity    Social History     Socioeconomic History     Marital status:      Spouse name: None     Number of children: None     Years of education: None     Highest education level: None   Occupational History     None   Social Needs     Financial resource strain: None     Food insecurity:     Worry: None     Inability: None     Transportation needs:     Medical: None     Non-medical: None   Tobacco Use     Smoking status: Light Tobacco Smoker     Packs/day: 0.50     Smokeless tobacco: Never Used   Substance and Sexual Activity     Alcohol use: Yes     Comment: rarely     Drug use: Yes     Types: Marijuana     Sexual activity: Yes     Partners: Male     Birth control/protection: None   Lifestyle     Physical activity:     Days per week: None     Minutes per session: None     Stress: None    Relationships     Social connections:     Talks on phone: None     Gets together: None     Attends Congregation service: None     Active member of club or organization: None     Attends meetings of clubs or organizations: None     Relationship status: None     Intimate partner violence:     Fear of current or ex partner: None     Emotionally abused: None     Physically abused: None     Forced sexual activity: None   Other Topics Concern     None   Social History Narrative    Lives with  and son.  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         ROS  A comprehensive review of systems was negative.  Pertinent items are noted in HPI.  Patient denies chest pain or pressure, shortness of breath, exertional intolerance, palpitations, or lightheadedness.      Vitals:    08/07/19 1234   BP: 106/72   Pulse: 78   Resp: 14   Temp: 98.4  F (36.9  C)     Initial Weight: 277 lbs  Weight: (!) 276 lb 14.4 oz (125.6 kg)  Weight loss from initial: 0.1  % Weight loss: 0.04 %  Waist Circumference: 50.5 inches    Body mass index is 45.38 kg/m .    EXAM                    General   Appearance:  Alert, pleasant, cooperative, no distress, appears stated age, patient is obese   Neck:   Supple, symmetrical, trachea midline, no adenopathy;     thyroid:  no enlargement/tenderness/nodules   Lungs:     Clear to auscultation bilaterally without wheezes, rales, or rhonchi, respirations unlabored    Heart:    Regular rate and rhythm, S1 and S2 normal, no murmur, rub   or gallop                                 Abdomen:  Soft, nontender, nondistended. No hepatosplenomegaly.          Extremities:  Warm, well perfused, no edema.           Skin:  Skin color, texture, and turgor normal.  No skin tags, striae, hirsutism, or acanthosis nigricans    Body composition analyzer done and results reviewed with the patient.  Please see scanned results in chart.    Labs ordered and will review and discuss with the patient.    EKG: by my interpretation,  normal sinus rhythm (cardiology read pending)

## 2021-06-01 NOTE — PATIENT INSTRUCTIONS - HE
Before being submitted for insurance approval, you will need the following:    -Clearance by the Psychologist  -Clearance by the dietitian  -Attend Support Group ([address] [place] [address] at [facility]) 2nd Tuesday of the month 6:30-8pm. Make sure to sign in.  -Routine Health Care Maintenance must be up to date (mammograms yearly after age 40, paps as recommended by your primary provider,  colonoscopy after age 50, earlier if high risk.  -If you are on estrogen-estrogen will be discontinued one month prior to surgery. It may be resumed one month after surgery unless otherwise advised.  -Pre Operative Lab work-ordered today  -Structured weight loss visits IF mandated by your insurance carrier  -Surgeon consult  -You will need to be using CPAP for at least one month before surgery if you have sleep apnea. Make sure to bring your CPAP or BiPAP to the hospital at the time of surgery.  -You will need to be tobacco free for 2 months before surgery and remain a non-smoker thereafter. If you are currently smoking or have recently quit, your urine will be evaluated for tobacco metabolites pre-operatively.  -If you are on insulin, you might be referred to an endocrinologist who will manage your insulin during the liquid diet and around the time of surgery. This endocrinologist does not replace your primary provider or your endocrinologist.   -You will need an exercise plan which includes MOVE, ie., walking and MUSCLE, ie.,calisthenics, bands, weight, machines, etc...  ______________________________________________________________________    Remember that after your bariatric surgery, vitamin supplementation is a lifelong need.    You will take:    B-12 1000mcg or higher sublingual (under the tongue) daily or by injection 1-2X/month  D3 5000U daily   Multivitamin containing 18mg of iron twice a day  Calcium citrate 1 or 2 daily    To keep your weight off and your vitamin levels up, follow-up is important.    Your labs will be  monitored every 6 months for the first two years (every 3 months if you had a duodenal switch) and yearly thereafter.    To avoid ulcers in your stomach avoid tobacco forever, alcohol in excess, caffeine in excess and anti-inflammatories (NSAIDS)  (Aspirin, Ibuprofen, Naproxen and similar medications). Tylenol is fine.  If you are told by your physician take Aspirin to protect your heart or for another reason, make sure to take omeprazole or similar medication (protonix, nexium, prevacid) to protect your stomach.    Remember that alcohol affects you differently after bariatric surgery. If you have even ONE drink DO NOT DRIVE.

## 2021-06-01 NOTE — PROGRESS NOTES
Outpatient Bariatric Medicine Consultation  Indication: Medical Bariatric Consultation to Precede Bariatric Surgery  Primary Provider: Provider, No Primary Care  Requesting Physician: Dr. Handy  Type of Bariatric Surgery: RNY gastric bypass    Impression Shameka Maurice is a 33 y.o. year old female with primary anovulatory infertility, pre-diabetes, history of generalized anxiety disorder, history f ADHD and morbid obesity who presents for medical bariatric consultation.     Poor functional capacity and musculoskeletal disability due to morbid obesity which satisfies NIH criteria for bariatric surgery. Her BMI is Body mass index is 45.39 kg/m ..    Shameka Maurice hopes to achieve improved energy and the ability to go hiking following surgery and significant weight loss.    BARIATRIC RECOMMENDATIONS  Bariatric therapy is indicated for Shameka Maurice as a means of modifying her obesity related co-morbidities.  Therapeutic lifestyle changes have not lead to significant and or durable weight loss.  Surgical bariatric therapy is most likely to induce significant weight loss, promote long-term weight maintenance and lead to clinical improvement and/or resolution of her weight related co-morbidities.   -Medical Nutrition Therapy is indicated including comprehensive guidance of nutrition and lifestyle management.  -A Bariatric Psychological Assessment and clearance is indicated.  -Screening Bariatric Laboratory studies are indicated.  -Currency of Routine Healthcare Maintenance is indicated.  -Physical Activity Guidance was provided. Follow up of recommendations will be provided.    She will stop her sugared coffee drinks. I suggested she try adding protein shakes to her coffee. She will stop the phentermine as she is also taking adderall. She will need to get on some type of birth control.     PERIOPERATIVE RECOMMENDATIONS  CARDIAC: Cardiac consultation and clearance will be required of  patients with significant cardiac disease and/or multiple cardiac risk factors.  PULMONARY: Pre-operative therapy with CPAP/BIPAP is indicated for a minimum of one month for patients with sleep apnea. Complete tobacco abstinence for two months pre-operatively and indefinitely thereafter is required.   RENAL: Diuretics will be discontinued 2 weeks before surgery at the time of liquid diet if the patient is on them at that time.  ENDOCRINE: Optimizing perioperative glycemic control is indicated. Our goal is an AIC of 8 or less at the time of surgery for optimal healing. Patients using insulin will be referred to a [facility] endocrinologist for perioperative insulin management.  GASTROINTESTINAL: Evaluation of the esophagus and stomach by EGD and/or UGI series will be considered in patients with severe GERD, previous weight loss surgery, or other indication.  GYNECOLOGIC: For patients on Estrogen- Estrogen will be discontinued 4 weeks prior to surgery and resumed 4 weeks after surgery unless otherwise advised. Reliable contraception is required post-operatively for 1 year for women of childbearing age. DEPOT PROVERA is contraindicated due to its association with weight gain. Post-operative birth control plan is undecided  MUSCULOSKELETAL/RHEUMATOLOGIC: NSAIDS are contraindicated following surgery and lifelong abstinence is indicated. When indicated for cardioprotection or otherwise, patients should use an enteric coated ASA and concomitant proton pump inhibitor.  HEMATOLOGIC: Risks of deep venous thromboembolism have been assessed. Patients with history of DVT/PE or current anti-coagulation will be placed on the High Risk DVT Prophylaxis Protocol. Objections (if any) to receiving blood products if necessary have been documented.  DENTAL: Reasonable and functional dentition is indicated in order to chew food to applesauce consistency post-operatively.  NUTRITIONAL: Pre and post-operative nutritional and lifestyle  "modification guidance is indicated. Pre-operative weight reduction can reduce liver volume, improving technical aspects of surgery.    > 60 minutes spent with pt, > 50 % spent in counseling    History Surrounding Consultation  Struggles with weight started at age 8  Her weight at age 20 was 185#  She has had several past supervised and unsupervised weight loss attempts  The most weight lost was: 48#  Unfortunately there was not durable weight maintenance.  History of bulimia, anorexia, or binge eating disorder? no  If Present has eating disorder been in remission at least 3 years? na    Dietary History  Meals per day: 1-2  Snacks: yes  Typical Snack: belvita crackers or goldfish  Who does the grocery shopping? patient  Who does the cooking? patient  A Typical meal includes: B: skips or eggs and corned beef hash or sausage or swiss and turkey perdomo sandwich L: skips   D: chicken and ground beef and vegetables and fruit or spaghetti  Regular Pop: once a month, soda, sweet tea  Juice: occasionally  Caffeine: sugared coffee drink most days of the week  Amount of restaurant eating per week: 2-3      Physical Activity Patterns  Current physical activity routine includes: walks at work, usually gets 5500 steps at work, walks the dog around the block once a day    Limitations from being physically active on a regular basis includes: energy, time, occasional pain on the bottoms of her feet    She describes her general health as: good    Past Medical History  HTN: no  Dyslipidemia: no  KAITLIN: not tested  Obesity Hypoventilation: NO  DM2: no DX: no Most recent AIC: 5.6  Neuropathy: no  Nephropathy: no  Kidney Stones: no  Ophthalmopathy: no  IFG or \"pre-DM\": yes  MI: no  CVA:no  CHF: no  Heart Valves: no  Previous cardiac testing includes: ekg sinus bradycardia  Cancers: no  DVT: no  PE: no  Colitis: no  Crohn's: no  IBS: no  PUD: no  UGI/endoscopy: na  Fatty Liver: no  Abnormal LFTs: no  Hepatitis: no  Asthma: no  Bronchitis: " no  Pneumonia: no  Other Lung Problems: no  Back Pain:no  DDD: no  Gout: no  Fibromyalgia: no  USI: no  Severe Headaches: no  Seizures: no If so, last seizure: na  Pseudotumor: no  PCOS: no  Hirsutism: no  Menstrual Irregularity: no  Menorrhagia: no  Infertility: yes  Thyroid problems: no  Thyroid medications: no  Glaucoma: no  HIV positive: NO  MRSA/VRE history: no  Hx of lower leg ulcers: no  History of Blood transfusion: no  Anemia: no    Pap smear: 7/19/19  Mammogram: na  Colonoscopy: na    Medications   Current Outpatient Medications on File Prior to Visit   Medication Sig Dispense Refill     cyclobenzaprine (FLEXERIL) 10 MG tablet Take 1 tablet (10 mg total) by mouth 3 (three) times a day as needed for muscle spasms. 30 tablet 0     FLUoxetine (PROZAC) 20 MG capsule Take 20 mg by mouth daily.       LORazepam (ATIVAN) 0.5 MG tablet Take 0.5 mg by mouth as needed.  1     phentermine 15 MG capsule Take 1 capsule (15 mg total) by mouth every morning. 30 capsule 0     [DISCONTINUED] ibuprofen (ADVIL,MOTRIN) 800 MG tablet Take 1 tablet (800 mg total) by mouth every 8 (eight) hours as needed for pain. 30 tablet 0     [DISCONTINUED] varenicline (CHANTIX STARTING MONTH BOX) 0.5 mg (11)- 1 mg (42) tablet 1 wk before you stop smoking take 0.5mg daily on days 1-3, 0.5mg 2 times each day on days 4-7, then 1mg 2 times daily. 53 tablet 0     [DISCONTINUED] varenicline (CHANTIX) 1 mg tablet Take 1 tab by mouth two times a day. Take after eating with a full glass of water. NOTE: Dispense as maintenance for refills only. 60 tablet 2     No current facility-administered medications on file prior to visit.      Allergies   Allergies   Allergen Reactions     Morphine Nausea And Vomiting     Septra [Sulfamethoxazole-Trimethoprim] Hives     Past Surgical History  Past Surgical History:   Procedure Laterality Date     APPENDECTOMY       DIAGNOSTIC LAPAROSCOPY Right 9/3/2014    Procedure: Laparoscopy with Right Salpingectomy;   Surgeon: Dallas Laird MD;  Location: [facility];  Service:      History of problems with anesthesia: no  History of Malignant Hyperthermia: NO        Gestational DM: unsure  Gestational HTN: no  Preeclampsia: no  Current Birth Control: none    Family History  Family History   Problem Relation Age of Onset     Breast cancer Mother      No Medical Problems Father      Breast cancer Maternal Grandmother        Social History  Social History     Socioeconomic History     Marital status:      Spouse name: None     Number of children: None     Years of education: None     Highest education level: None   Occupational History     None   Social Needs     Financial resource strain: None     Food insecurity:     Worry: None     Inability: None     Transportation needs:     Medical: None     Non-medical: None   Tobacco Use     Smoking status: Light Tobacco Smoker     Packs/day: 0.50     Smokeless tobacco: Never Used   Substance and Sexual Activity     Alcohol use: Yes     Comment: rarely     Drug use: Yes     Types: Marijuana     Sexual activity: Yes     Partners: Male     Birth control/protection: None   Lifestyle     Physical activity:     Days per week: None     Minutes per session: None     Stress: None   Relationships     Social connections:     Talks on phone: None     Gets together: None     Attends Protestant service: None     Active member of club or organization: None     Attends meetings of clubs or organizations: None     Relationship status: None     Intimate partner violence:     Fear of current or ex partner: None     Emotionally abused: None     Physically abused: None     Forced sexual activity: None   Other Topics Concern     None   Social History Narrative    Lives with  and son.  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       Addiction History  Smoking History: Yes  Started smoking: 12 Quit smokin Total years of tobacco use: 21  Alcohol use: Yes-  1 1/2 cans of sugary vodka  "drinks on the weekend  Current or Past history of alcohol or substance abuse: no  Last used: na  Chemical Dependency Treatment History: no  Chemicals: na    Psychiatric History  Diagnoses: ADD, anxiety  Treated by: medication  Psychiatric Hospitalizations: no  ECT: no  Panic attacks: last one a couple years ago  History of Abuse: no    Palliative Medicine History  Involvement in a pain clinic: no    Dental History  Missing teeth: no  Pending Dental Work: no  Regular Dental Visits: yes  Dentures/Partials: no      ROS  General  Fatigue: someitmes  Sleep Quality:fair  HEENT  Visual changes: no  Cardiovascular  Chest Pain with Exertion: yes  Dyspnea with Exertion: yes  Palpitations: no  Lower Extremity Edema: no  Syncope: no  Pulmonary  Shortness of breath at rest: no  Snoring: yes  STOP BANG score: 3  [place] Score: 6  Witnessed Apnea: no  Wheezing: no  CPAP use: na  Gastrointestinal  Trouble swallowing:no  Heartburn: no  Abdominal pain: no  Hematochezia: no  Urologic  Hesitancy: no  Urgency: no  Neurologic  Severe headache:no  Psychiatric  Moods Stable: yes  Rheumatologic  Myalgias: yes  Arthralgias: yes  Endocrine  Polydipsia: no  Polyuria: no  Galactorrhea: no  Heat intolerance: no  Musculoskeletal  Back Pain: no  Knee Pain: no  Foot Pain: no  Hip Pain: no  Falls: no  Use of cane, crutch or motorized scooter: no  Hematologic  Abnormal Bleeding or Clotting: no  Dermatologic  Furuncless: no  Acne: no  Intertrigo: yes        Physical Exam  Vitals: Ht 5' 5.5\" (1.664 m)   Wt (!) 277 lb (125.6 kg)   Breastfeeding? No   BMI 45.39 kg/m    Weight:   Wt Readings from Last 4 Encounters:   09/10/19 (!) 277 lb (125.6 kg)   08/07/19 (!) 276 lb 14.4 oz (125.6 kg)   07/19/19 (!) 275 lb 12.8 oz (125.1 kg)   10/02/17 (!) 295 lb (133.8 kg)     Height: 5' 5.5\" (1.664 m) (9/10/2019 10:53 AM)  Initial Weight: 277 lbs (9/10/2019 10:53 AM)  Weight: (!) 277 lb (125.6 kg) (9/10/2019 10:53 AM)  Weight loss from initial: 0 (9/10/2019 10:53 " AM)  % Weight loss: 0 % (9/10/2019 10:53 AM)  BMI (Calculated): 45.4 (9/10/2019 10:53 AM)    BMI: Body mass index is 45.39 kg/m .    General Appearance  No acute distress. Obesity: morbid  Alert: yes  HEENT  PERRLA  Neck  Stout:  No carotid bruits  Cardiovascular  Rhythm regular  Murmur: no  Pulmonary  Lungs clear to ascultation  Abdomen  No rashes.   Post surgical Scars: well healed, appendectomy  Extremities:  Pitting edema: no  Neurologic  Tremors: no  Psychiatric  Thought Content Organized  Moods stable  Endocrine  Moon Facies: NO  Dorsal Thoracic Prominence: NO  Skin Tags: no  Acanthosis nigricans: no  Dermatologic  Intertrigo: yes    We reviewed the important post op bariatric recommendations:  -eating 3 meals daily  -eating protein first, getting >60gm protein daily  -eating slowly, chewing food well  -avoiding/limiting calorie containing beverages  -drinking water 15-30 minutes before or after meals  -choosing wheat, not white with breads, crackers, pastas, harvey, bagels, tortillas, rice  -limiting restaurant or cafeteria eating to twice a week or less    We discussed the importance of restorative sleep and stress management in maintaining a healthy weight.  We discussed the National Weight Control Registry healthy weight maintenance strategies and ways to optimize metabolism.  We discussed the importance of physical activity including cardiovascular and strength training in maintaining a healthier weight.  We discussed the importance of lifelong follow-up.    Shameka Moreno Niyajunie was reminded that, postoperatively,  to avoid marginal ulcers she should avoid tobacco at all, alcohol in excess, caffeine in excess, and NSAIDS (unless indicated for cardioprotection or othewise and opposed by a PPI).           Much or all of the text in this note was generated through the use of Dragon Dictate voice-to-text software. Errors in spelling or words which seem out of context are unintentional. Sound alike errors, in  particular, may have escaped editing.                                      Answers for HPI/ROS submitted by the patient on 9/9/2019   BARIATRIC NEW PATIENT  Interested in Surgery?: Yes  [place] About?: Specialty Care Doctor  Barriers to learning:: No  Attended Seminar?: Yes  Date Attended:: 8/22/2019  PCP:: YARIEL RENDON [place], [place]  Specialist:: N/A  Mental Health Provider:: YARIEL JARAMILLO, YOLANDE FOLEY  Present Ht:: 66  Present Wt:: 275  Age Overweight:: 8  Age 89lbs:: 12  Wt 20yrs:: 185  Wt 5yrs ago:: 200  # of Wt Loss Efforts:: 5+  Prescribed Wt Loss Meds?: Yes  Prescribed Meds:: PHENTERMINE HCL 15MG  OTC Wt Loss Meds?: Yes  OTC Meds:: ACAI HAIRSTON, FAST BUSTER...  Surgeon Choice:: undecided  Procedure Choice:: Sandi-en Y gastric by-pass  Program1:: IT WORKS  Start Date1:: 1/1/2019  End Date1:: 6/1/2019  Total Months1:: 6  Starting Wt1:: 187  Ending Wt1:: 180  Pounds Lost1:: 7  Program2:: THRIVE  Start Date2:: 1/1/2017  End Date2:: 6/1/2019  Total Months2:: 48  Starting Wt2:: 185  Ending Wt2:: 180  Pounds Lost2:: 7  Program3:: SLIM GENICS  Start Date3:: 1/1/2017  End Date3:: 9/9/2019  Total Months3:: 48  Starting Wt3:: 196  Ending Wt3:: 175  Pounds Lost3:: 21  Program4:: WEIGHT WATCHERS  Start Date4:: 2/1/2015  End Date4:: 6/1/2015  Total Months4:: 4  Starting Wt4:: 220  Ending Wt4:: 220  Pounds Lost4:: 0  Program5:: BEACH BODY  Start Date5:: 1/1/2016  End Date5:: 3/1/2016  Total Months5:: 3  Starting Wt5:: 220  Ending Wt5:: 220  Pounds Lost5:: 0

## 2021-06-01 NOTE — PROGRESS NOTES
I met with Shameka this afternoon while she was in the clinic for her initial bariatric consult with Dr. Post.  She has [employer] through [place] and will need to have 6 months of SWL,  Appointment were made for psych and testing.  She has a weight history in her chart.  She will need to complete the needs list provided to her.  She did quit smoking over a month ago so she will need to have a UA done before scheduling with the surgeon.  She may be attending the support group this evening if she can find a sitter for her 2 year old.  Otherwise states she will go next month.

## 2021-06-02 NOTE — PROGRESS NOTES
Initial Structured Weight Loss Supervised Diet Evaluation     Assessment:  Pt. is being seen today for initial RD nutritional evaluation. Pt. has been unsuccessful with non-surgical weight loss methods and is interested in bariatric surgery. Today we reviewed current eating habits and level of physical activity, and instructed on the changes that are required for successful bariatric outcomes.    Surgery of interest per pt: RNY with Dr. Handy.    Workflow review:  Support Group: Not completed.  Psychology:Scheduled.  Lab work:Completed.  SWL:Yes 2/6    Weight goal: At or below initial.     Pt Active Problem List Diagnosis:    Patient Active Problem List   Diagnosis     Attention deficit hyperactivity disorder (ADHD), combined type     Generalized anxiety disorder     Primary anovulatory infertility       Pt's Initial Weight: 277 lbs  Weight: (!) 270 lb 1.6 oz (122.5 kg)  Weight loss from initial: 6.9  % Weight loss: 2.49 %    BMI: Body mass index is 44.26 kg/m .    Estimated RMR (Chouteau-St Jeor equation): 1951 calories    Food allergies, intolerances, and Druze customs: NKFA    Diabetic: No  HbA1c:    Hemoglobin A1c   Date/Time Value Ref Range Status   08/07/2019 01:35 PM 5.6 3.5 - 6.0 % Final     Vitamins currently taking: multivitamin and vitamin D    Biggest struggle with weight loss: keeping it off, large portions at times, sweet tooth    Diet/Weight History  Method or Diet: healthy eating  # loss(-) or gain(+): not sustained    Socioeconomic Status:  Where do you grocery shop?: [employer]  Utilizing food bank, food stamps, and/or meal delivery program(s)?: No     Who does the grocery shopping for your household? self  Who prepares your meals at home? self    Diet Recall/Time:   Wake up time: 5:30a  Breakfast: caribou- turkey perdomo spinach and swiss cheese sandwich  Am Snack: none  Lunch: skip  Pm snack: none  Dinner: 1/2 steak, couple bites of corn, grapes  HS Snack: none    Overnight eating: No     Per  Diet recall estimated protein: 40 grams    Meals per week away from home: varies- will grab food on the way to work    Beverages (Type/Oz. per day)  Water: 64oz  Coffee: none  Speciality Coffee: 1-2 per week  Milk: vitamin D occasionally  Soda: none  Alcohol: rare- social   Other: none    Exercise  Type: walk dog regularly- most days, walking at work    PES statement:   1. (NI-1.3)Excessive energy intake related to Food and nutrition related knowledge deficit concerning excessive energy/oral intake as evidenced by Intake of high caloric density foods at meals and/or snacks; large portions; Estimated intake that exceeds estimated daily energy intake; Frequent excessive fast food or restaurant intake; and BMI 44.26     Intervention    Nutrition Education:   1. Provided general overview of diet and lifestyle modifications needed to be a deemed a safe candidate for bariatric surgery.     Food/Nutrient Delivery:  2. Educated pt on eating three meals, with cutting out snacking.   3. Educated pt on using a protein powder drink as a meal replacement and/or supplement after bariatric surgery.   4. Discussed importance of adequate hydration after surgery, with goal of at least 64 oz of fluids/day.  5. Addressed avoiding all carbonated, caffeinated and sweetened drinks to prepare for bariatric surgery.     Nutrition Counselin. Mindful eating techniques: Encouraged slow meal pace, chewing foods to applesauce consistency for 20-30 minutes/meal.   7. Discussed  fluids 30 minutes before, during, and after meal to prevent dumping syndrome and discomfort post bariatric surgery.   8. Discussed pre/post operative diet progression, post op vitamin regimen, gave review of surgery process.       Instructions/Goals:     1. Practice portion control  2. Slow down with meals 20-30 min  3. Chew foods to applesauce consistency    Handouts Provided:   Pt. [facility] Bariatric Care Patient  Handbook      Monitor/Evaluation:  Pt. s target weight: no gain from initial visit, pt. verbalized understanding.     Plan for next visit:   Bariatric plate. Give food journal homework.    Visit length: 30 minutes.     ABN signed: Yes
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
## 2021-06-03 VITALS — HEIGHT: 66 IN | BODY MASS INDEX: 44.5 KG/M2 | WEIGHT: 276.9 LBS

## 2021-06-03 VITALS — WEIGHT: 270.1 LBS | HEIGHT: 66 IN | BODY MASS INDEX: 43.41 KG/M2

## 2021-06-03 VITALS — WEIGHT: 275.8 LBS | BODY MASS INDEX: 44.32 KG/M2 | HEIGHT: 66 IN

## 2021-06-03 VITALS
HEART RATE: 85 BPM | OXYGEN SATURATION: 97 % | WEIGHT: 277 LBS | SYSTOLIC BLOOD PRESSURE: 118 MMHG | HEIGHT: 66 IN | BODY MASS INDEX: 44.52 KG/M2 | DIASTOLIC BLOOD PRESSURE: 74 MMHG

## 2021-06-03 VITALS — BODY MASS INDEX: 42.46 KG/M2 | HEIGHT: 66 IN | WEIGHT: 264.2 LBS

## 2021-06-03 NOTE — PROGRESS NOTES
Health and Behavior Assessment with Intervention, Follow up (60 minutes): Met with patient 1:1 to review support system, psychological testing and mindful eating strategies.  Weight today was 266-1/2 pounds.  Although she has lost weight she is still generally skipping breakfast and lunch.  She also needs to increase her activity level by going to the gym and her apartment complex.  She will follow-up with a list of activities and mindful eating strategies.  Diagnoses: F 41.9; F 90.9; E 66.01

## 2021-06-03 NOTE — PROGRESS NOTES
Follow Up Surgical Weight Loss Supervised Diet Evaluation    Assessment:  Pt. is being seen today for a follow up RD nutritional evaluation. Pt. has been unsuccessful with non-surgical weight loss methods and is interested in bariatric surgery. Today we reviewed current eating habits and level of physical activity, and instructed on the changes that are required for successful bariatric outcomes.    Surgery of interest per pt: BRANDEN Handy    Workflow review:  Support Group: Not completed.  Psychology:In progress.  Lab work:Completed.  SWL:Yes 3/6     Weight goal: At or below initial.    Patient Active Problem List:  Patient Active Problem List   Diagnosis     Attention deficit hyperactivity disorder (ADHD), combined type     Generalized anxiety disorder     Primary anovulatory infertility       Pt's Initial Weight: 277 lbs  Weight: (!) 264 lb 3.2 oz (119.8 kg)  Weight loss from initial: 12.8  % Weight loss: 4.62 %    BMI: Body mass index is 43.3 kg/m .    Estimated RMR ([place]-St Jeor equation): 1925 calories    Progress over past month: struggles with eating three meals per day- appetite decreased due to medications per patient report  1. Portion control- goal met  2. 20-30min per meal- goal met  3. Chewing foods well- goal met    Diabetic: No  HbA1c:    Hemoglobin A1c   Date/Time Value Ref Range Status   08/07/2019 01:35 PM 5.6 3.5 - 6.0 % Final     Diet Recall/Time:   Breakfast: bagel with egg and cheese  Am Snack: none  Lunch: skip  Pm snack:none  Dinner: salmon and a little bit of chicken  HS Snack: none    Overnight eating: No     Per Diet recall estimated protein: 25 grams    Meals per week away from home: majority of meals at home    Meal duration: 20 minutes.     Beverages (Type/Oz. per day)  Water: 64oz  Speciality Coffee: 1/2 large coffee per day  Soda: none  Alcohol: none  Other: none     fluids by 30 minutes before, during meal, and waiting 30 minutes after meal before drinking fluids:  Yes    Exercise  Type: no change from initial visit    PES statement:   1. Undesirable food choices related to food and nutrition related knowledge deficit as evidenced by patient skipping meals and inadequate protein intake    Intervention    Nutrition Education:   1. Provided general overview of diet and lifestyle modifications needed to be a deemed a safe candidate for bariatric surgery.     Food/Nutrient Delivery:  2. Educated pt on eating three meals, with cutting out snacking.  3. Bariatric Plate: Pt and I discussed the importance of including a lean protein source (20-30 grams/meal), vegetables (included at lunch and dinner), one serving (15g) of carbohydrate, and limited added fat (1 tb/day) at each meal.   4. Educated pt on how to complete a food journal and benefits of meal planning.   5. Educated pt on using a protein powder drink as a meal replacement and/or supplement after bariatric surgery.   6. Discussed importance of adequate hydration after surgery, with goal of at least 64 oz of fluids/day.  7. Addressed avoiding all carbonated, caffeinated and sweetened drinks to prepare for bariatric surgery.     Nutrition Counselin. Mindful eating techniques: Encouraged slow meal pace, chewing foods to applesauce consistency for 20-30 minutes/meal.   9. Discussed  fluids 30 minutes before, during, and after meal to prevent dumping syndrome and discomfort post bariatric surgery.       Instructions/Goals:     1. Fill out food journal and return at follow up.  2. Have a protein shake at lunch  3. Separate fluids from meals    Handouts Provided:   Food journal      Monitor/Evaluation:  Pt. s target weight: no gain from initial visit, pt. verbalized understanding.     Plan for next visit:   Review Bariatric plate and food journal homework.  Educate on dumping syndrome and reading food labels.  Review [place] to Bariatric Success    Visit length: 30 minutes.     ABN signed: Yes

## 2021-06-03 NOTE — TELEPHONE ENCOUNTER
12/02/19    Patient's Provider: Kaylyn        Appointment type missed: F/U        Patient's reason no showed: lost phone and looking for it         Rescheduled appointment date: 12/30

## 2021-06-04 VITALS — BODY MASS INDEX: 42.19 KG/M2 | HEIGHT: 66 IN | WEIGHT: 262.5 LBS

## 2021-06-04 VITALS — WEIGHT: 253.4 LBS | HEIGHT: 66 IN | BODY MASS INDEX: 40.72 KG/M2

## 2021-06-04 VITALS — BODY MASS INDEX: 41.67 KG/M2 | HEIGHT: 66 IN | WEIGHT: 259.3 LBS

## 2021-06-04 VITALS
OXYGEN SATURATION: 99 % | SYSTOLIC BLOOD PRESSURE: 118 MMHG | BODY MASS INDEX: 42.25 KG/M2 | WEIGHT: 257.8 LBS | DIASTOLIC BLOOD PRESSURE: 70 MMHG | HEART RATE: 66 BPM

## 2021-06-04 VITALS — WEIGHT: 257 LBS | BODY MASS INDEX: 41.3 KG/M2 | HEIGHT: 66 IN
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
## 2021-06-04 NOTE — PROGRESS NOTES
Follow Up Surgical Weight Loss Supervised Diet Evaluation    Assessment:  Pt. is being seen today for a follow up RD nutritional evaluation. Pt. has been unsuccessful with non-surgical weight loss methods and is interested in bariatric surgery. Today we reviewed current eating habits and level of physical activity, and instructed on the changes that are required for successful bariatric outcomes.    Workflow review:  Support Group: Not completed.  Psychology:Completed.  Lab work:Completed.  SWL:Yes 4/6      Weight goal: At or below initial.    Patient Active Problem List:  Patient Active Problem List   Diagnosis     Attention deficit hyperactivity disorder (ADHD), combined type     Generalized anxiety disorder     Primary anovulatory infertility     Pt's Initial Weight: 277 lbs  Weight: (!) 262 lb 8 oz (119.1 kg)  Weight loss from initial: 14.5  % Weight loss: 5.23 %    BMI: Body mass index is 43.02 kg/m .    Estimated RMR ([place]-St Jeor equation): 1912 calories    Progress over past month:   1. Protein shake at lunch- doing at breakfast   2. Separate fluids from meals- goal met    Diabetic: No  HbA1c:    Hemoglobin A1c   Date/Time Value Ref Range Status   08/07/2019 01:35 PM 5.6 3.5 - 6.0 % Final     Diet Recall/Time:   Breakfast: protein shake (30g)  Am Snack: none  Lunch: fruit and tuna with crackers (20g)  Pm snack: none  Dinner: rotisserie chicken (20g)  HS Snack: none    Per Diet recall estimated protein: 70g grams    Meal duration: 15-20 minutes.     Beverages (Type/Oz. per day)  Water: 64+oz  Coffee: every once in while     fluids by 30 minutes before, during meal, and waiting 30 minutes after meal before drinking fluids: No    Exercise  Type: ADLS    PES statement:   1. (NI-1.3)Excessive energy intake related to Food and nutrition related knowledge deficit concerning excessive energy/oral intake as evidenced by Intake of high caloric density foods/beverages (juice, soda, alcohol) at meals and/or  snacks; large portions; frequent grazing; Estimated intake that exceeds estimated daily energy intake; Binge eating patterns; Frequent excessive fast food or restaurant intake; and BMI 43     Intervention    Nutrition Education:   1. Provided general overview of diet and lifestyle modifications needed to be a deemed a safe candidate for bariatric surgery.   2. Educated pt on how to read a food label: choosing foods with than 10 grams fat and 10 grams sugar per serving to avoid dumping syndrome.  3. Dumping Syndrome: Described the mechanisms of syndrome, symptoms, and prevention tools from a dietary perspective.     Food/Nutrient Delivery:  4. Educated pt on eating three meals, with cutting out snacking.  5. Educated pt on how to complete a food journal and benefits of meal planning.   6. Educated pt on using a protein powder drink as a meal replacement and/or supplement after bariatric surgery.   7. Discussed importance of adequate hydration after surgery, with goal of at least 64 oz of fluids/day.  8. Addressed avoiding all carbonated, caffeinated and sweetened drinks to prepare for bariatric surgery.     Nutrition Counselin. Mindful eating techniques: Encouraged slow meal pace, chewing foods to applesauce consistency for 20-30 minutes/meal.   10. Discussed  fluids 30 minutes before, during, and after meal to prevent dumping syndrome and discomfort post bariatric surgery.       Instructions/Goals:     1. Fill out food journal and return at follow up.    Handouts Provided:   Dumping Syndrome  Food Label      Monitor/Evaluation:  Pt. s target weight: no gain from initial visit, pt. verbalized understanding.     Plan for next visit:   Review Bariatric plate and food journal homework.  Review [place] to Bariatric Success    Visit length: 30 minutes.     ABN signed: Yes

## 2021-06-04 NOTE — PROGRESS NOTES
Follow Up Surgical Weight Loss Supervised Diet Evaluation    Assessment:  Pt. is being seen today for a follow up RD nutritional evaluation. Pt. has been unsuccessful with non-surgical weight loss methods and is interested in bariatric surgery. Today we reviewed current eating habits and level of physical activity, and instructed on the changes that are required for successful bariatric outcomes.    Workflow review:  Support Group: Not completed.  Psychology:Completed.  Lab work:Completed.  SWL:Yes 5/6       Weight goal: At or below initial.    Patient Active Problem List:  Patient Active Problem List   Diagnosis     Attention deficit hyperactivity disorder (ADHD), combined type     Generalized anxiety disorder     Primary anovulatory infertility     Pt's Initial Weight: 277 lbs  Weight: (!) 259 lb 4.8 oz (117.6 kg)  Weight loss from initial: 17.7  % Weight loss: 6.39 %    BMI: Body mass index is 42.49 kg/m .    Estimated RMR ([place]-St Jeor equation): 1897 calories    Progress over past month: patient states she is doing well and is more mindful of her food choices    Diabetic: No  HbA1c:    Hemoglobin A1c   Date/Time Value Ref Range Status   08/07/2019 01:35 PM 5.6 3.5 - 6.0 % Final     Diet Recall/Time:   Breakfast: protein shake  Am Snack: none  Lunch: sambusas  Pm snack:none  Dinner: steak and broccoli with a little bit of cheese and potatoes  HS Snack: none    Meals per week away from home: rare    Meal duration: 20 minutes.     Beverages (Type/Oz. per day)  Water: 64oz  Coffee: none- occasionally, maybe once or twice     fluids by 30 minutes before, during meal, and waiting 30 minutes after meal before drinking fluids: Yes    Exercise  Type: no change    PES statement:   1. (NI-1.3)Excessive energy intake related to Food and nutrition related knowledge deficit concerning excessive energy/oral intake as evidenced by Intake of high caloric density foods/beverages (juice, soda, alcohol) at meals  and/or snacks; large portions; frequent grazing; Estimated intake that exceeds estimated daily energy intake; Binge eating patterns; Frequent excessive fast food or restaurant intake; and BMI 42.49     Intervention    Nutrition Education:   1. Provided general overview of diet and lifestyle modifications needed to be a deemed a safe candidate for bariatric surgery.     Food/Nutrient Delivery:  2. Educated pt on eating three meals, with cutting out snacking.  3. Bariatric Plate: Pt and I discussed the importance of including a lean protein source (20-30 grams/meal), vegetables (included at lunch and dinner), one serving (15g) of carbohydrate, and limited added fat (1 tb/day) at each meal.   4. Educated pt on using a protein powder drink as a meal replacement and/or supplement after bariatric surgery.   5. Discussed importance of adequate hydration after surgery, with goal of at least 64 oz of fluids/day.  6. Addressed avoiding all carbonated, caffeinated and sweetened drinks to prepare for bariatric surgery.     Nutrition Counselin. Mindful eating techniques: Encouraged slow meal pace, chewing foods to applesauce consistency for 20-30 minutes/meal.   8. Discussed  fluids 30 minutes before, during, and after meal to prevent dumping syndrome and discomfort post bariatric surgery.     Handouts Provided:   None      Monitor/Evaluation:  Pt. s target weight: no gain from initial visit, pt. verbalized understanding.     Plan for next visit:   (Final Supervised Diet visit with RD) pre/post-op  diet progression, give review of surgery process.  Review [place] to Bariatric Success    Visit length: 30 minutes.     ABN signed: Yes

## 2021-06-04 NOTE — TELEPHONE ENCOUNTER
12/05/19        Patient's Provider: Elio         Appointment type missed: RET [facility]      Outcome: Left message for patient to call [phone] to reschedule missed appointment.

## 2021-06-05 VITALS — HEIGHT: 66 IN | BODY MASS INDEX: 23.63 KG/M2 | WEIGHT: 147 LBS

## 2021-06-05 VITALS — HEIGHT: 66 IN | BODY MASS INDEX: 29.99 KG/M2 | WEIGHT: 186.6 LBS

## 2021-06-05 VITALS — HEIGHT: 65 IN | BODY MASS INDEX: 39.92 KG/M2 | WEIGHT: 239.6 LBS

## 2021-06-05 VITALS — WEIGHT: 191 LBS | HEIGHT: 66 IN | BODY MASS INDEX: 30.7 KG/M2

## 2021-06-05 VITALS — HEIGHT: 65 IN | WEIGHT: 197 LBS | BODY MASS INDEX: 32.82 KG/M2

## 2021-06-05 VITALS — WEIGHT: 157 LBS | BODY MASS INDEX: 25.23 KG/M2 | HEIGHT: 66 IN

## 2021-06-05 VITALS — HEIGHT: 65 IN | BODY MASS INDEX: 33.82 KG/M2 | WEIGHT: 203 LBS

## 2021-06-05 NOTE — PROGRESS NOTES
Follow Up Surgical Weight Loss Supervised Diet Evaluation    Assessment:  Pt. is being seen today for a follow up RD nutritional evaluation. Pt. has been unsuccessful with non-surgical weight loss methods and is interested in bariatric surgery. Today we reviewed current eating habits and level of physical activity, and instructed on the changes that are required for successful bariatric outcomes.    Workflow review:  Support Group: Completed.  Psychology:Completed.  Lab work:Completed.  SWL:Yes 6/6    Weight goal: At or below initial.    Patient Active Problem List:  Patient Active Problem List   Diagnosis     Attention deficit hyperactivity disorder (ADHD), combined type     Generalized anxiety disorder     Primary anovulatory infertility     Pt's Initial Weight: 277 lbs  Weight: (!) 253 lb 6.4 oz (114.9 kg)  Weight loss from initial: 23.6  % Weight loss: 8.52 %    BMI: Body mass index is 41.53 kg/m .    Estimated RMR (Hardee-St Jeor equation): 1871 calories    Progress over past month: patient is excited about her progress and weight loss    HbA1c:    Hemoglobin A1c   Date/Time Value Ref Range Status   08/07/2019 01:35 PM 5.6 3.5 - 6.0 % Final     Meal duration: 20 minutes.     Beverages (Type/Oz. per day)  Water: 64oz     fluids by 30 minutes before, during meal, and waiting 30 minutes after meal before drinking fluids: Yes    PES statement:   1. (NI-1.3)Excessive energy intake related to Food and nutrition related knowledge deficit concerning excessive energy/oral intake as evidenced by Intake of high caloric density foods/beverages (juice, soda, alcohol) at meals and/or snacks; large portions; frequent grazing; Estimated intake that exceeds estimated daily energy intake; Binge eating patterns; Frequent excessive fast food or restaurant intake; and BMI 41.5     Intervention    Nutrition Education:   1. Provided general overview of diet and lifestyle modifications needed to be a deemed a safe candidate  for bariatric surgery.   2. Vitamins: Educated on post-op vitamin regimen including MVI+ 18 mg Fe two times a day, calcium citrate 400-600 mg two times a day, 1472-0399 mcg sublingual B12 daily, 5000 IU vitamin D3 daily.     Food/Nutrient Delivery:  3. Educated pt on eating three meals, with cutting out snacking.  4. Bariatric Plate: Pt and I discussed the importance of including a lean protein source (20-30 grams/meal), vegetables (included at lunch and dinner), one serving (15g) of carbohydrate, and limited added fat (1 tb/day) at each meal.   5. Educated pt on using a protein powder drink as a meal replacement and/or supplement after bariatric surgery.   6. Discussed importance of adequate hydration after surgery, with goal of at least 64 oz of fluids/day.  7. Addressed avoiding all carbonated, caffeinated and sweetened drinks to prepare for bariatric surgery.     Nutrition Counselin. Mindful eating techniques: Encouraged slow meal pace, chewing foods to applesauce consistency for 20-30 minutes/meal.   9. Discussed  fluids 30 minutes before, during, and after meal to prevent dumping syndrome and discomfort post bariatric surgery.   10. Discussed pre/post operative diet progression, post op vitamin regimen, gave review of surgery process.     Handouts Provided:   Diet Progression Overview  Why take vitamins for Life      Monitor/Evaluation:  Pt. s target weight: no gain from initial visit, pt. verbalized understanding.     Pt has completed all nutrition requirements and is well-informed of the dietary and physical activity requirements that are necessary for successful bariatric outcomes. This pt is an appropriate candidate for surgery from a nutrition standpoint at this time. The patient understands that surgery is a tool, not a cure, and post operative follow up is essential.     Plan for next visit:   3 month post op    Visit length: 15 minutes.     ABN signed: Yes

## 2021-06-05 NOTE — PROGRESS NOTES
Attended Support Group. Workflow updated.    Lila Harrington CMA  [facility]   P: [phone]  F: [phone]

## 2021-06-06 NOTE — PATIENT INSTRUCTIONS - HE
Your surgery is scheduled for 3/23/2020 at 9:15 am.  Please arrive at 7:15 am.      Medication Recommendations    Acetaminophen (Brand Name: Tylenol or MPAP)   You will likely be sent home on Tylenol to go with your other pain medications after surgery.  It is ok to cut/crush regular or extra strength tablets.  Make sure tablet is not long acting or extended release.  Do not take more than 3000mg in 24 hours.  If you decide to use liquid Tylenol at home, we recommend you use Adult strength because you will have to take less liquid to get the same effect.  Dilute the medication 1:1 with water before taking the liquid version to prevent dumping or stomach upset.    Alprazolam (Brand Name: Xanax)   Ok to cut/crush, or small enough to swallow whole. Unless told differently, ok to take as directed the morning of surgery with a sip of water.    Amphetamine/Dextroamphetamine (Brand Name: Adderall XR)   Ok to open the extended release capsule and sprinkle contents onto a spoonful of food. Do not chew mixture. Talk with primary care or mental health provider if you notice any changes in how well this medication is working after surgery.     Cyclobenzaprine (Brand Name: Flexeril)   Small enough to swallow whole.  May cut/crush if needed.    Estrogen containing birth control  Stop one month before surgery. May restart 30 days after surgery if ok with your surgeon.  Be sure to use another (non-estrogen containing) form of birth control during this time.    Fluoxetine (Brand Name: Prozac)   Ok to cut/crush tablet or open capsule.  However, this medication may not be absorbed as well after Sandi-en-Y gastric bypass surgery.  Watch for changes in symptom control.  Talk with primary care or mental health provider if you notice any changes in how well this medication is working after surgery. Ok to take as scheduled the morning of surgery with a sip of water.    Lorazepam (Brand Name: Ativan)   Small enough to swallow whole. Ok to  cut/crush. Unless told differently, ok to take as directed the morning of surgery with a sip of water.    Multivitamin with Iron   If not already taking, start chewable MVI with at least 18mg of iron and 10-15 mg of zinc twice a day at least 2 weeks prior to surgery and resume the day after surgery for life. Do not take the morning of surgery.    Nystatin  Ok to use. You are encouraged to bring to the hospital on the day of surgery if needed.    Omeprazole (Brand Name: Prilosec)   If not already taking, you will get a prescription to start when you get home from the hospital.  Tablets cannot be cut or crushed.  If a capsule, entire capsule contents may be sprinkled on a spoonful of applesauce and taken immediately without chewing.  If only on a full liquid diet, dump capsule contents into a spoonful of liquid and take without chewing.  May swallow whole again starting 6 weeks after surgery but can try swallowing sooner if having issues with opening into food.  Continue after surgery for at least 3 months.    Ursodiol (Brand name: Actigall)  Start two weeks after surgery.  Swallow whole with warm water, do not cut, crush, or open capsule up.  This is a medication that will help to prevent gallstones from forming during the first 6 months post-op of rapid weight loss.  Prescription was sent to your pharmacy.    Vitamin B12   If not already taking, start sublingual (under the tongue) vitamin B12 1,000 mcg at least 2 weeks prior to surgery and resume the day after surgery for life. Do not take the morning of surgery.    Vitamin D3 5000 IU   Ok to cut or crush tablet; if a softgel will likely need to swallow whole if small enough.  If capsule is too large, may need to take tablet form until able to swallow larger pills again.  Do not take the morning of surgery and don t restart again until instructed by the dietitian.       After Bariatric Surgery Discharge Instructions  [facility]      Note: Ask your nurse to order your medications from the pharmacy. Be sure you have your medications with you when you leave.    What should my diet be for the first 2 weeks after surgery?  Follow the bariatric diet the dietitian discussed with you.    How much fluid should I drink?  Strive for 48-64 ounces daily.  Carry a water bottle with you without a straw or sports top. Drink from it often.  Keep track of how much fluid you drink in a day.  Remember:  -Do not use straws, chew gum or suck on hard candies. They may cause painful gas.    -Sip, don't slurp when you drink.    -Practice small sips using a medicine cup for the first week postop.   -No ice or cold drinks. This could cause gas or spasms.   -No coffee, soda pop or drinks with caffeine. These may cause stomach pain.   -No alcohol. It is bad for your liver and will cause stomach pain.    How often should I do my deep breathing and coughing?  Use your incentive spirometer (small plastic breathing device) every hour while awake after you get home. Using the incentive spirometer helps you deep breath. Continuing to cough and deep breath will help prevent fevers and pneumonia.   If you do not have a fever after one week, you can stop using the incentive spirometer and discard it.     You can continue to take deep breaths without the incentive spirometer every one to two hours while awake for the month after surgery.    What kinds of activity can I do?  Get plenty of rest the first few days after surgery and try to balance rest and activity. You will need some time to recover - you may be more tired than you realize at first. You'll feel better and heal faster if you take good care of yourself.  For 2 weeks after surgery (Please review restrictions at your two week visit, they could change based on how well you are doing):   -Don't lift more than 20 pounds.   -Take 4-5 short walks every day.  -Don't jog, run, or do belly exercises.  Don't swim, bathe or use  a hot tub until your cuts are healed (scabs are gone).    You may shower right away after surgery.  Don't plan to fly or take a road trip within the first 1 to 3 months after surgery.  You could get a blood clot in your legs. If you must travel, get up and move around every hour for at least 5 minutes before continuing on your journey.  Your care team can help you decide when it's safe for you to travel.     What can I do for pain control?  You had major belly surgery that involves all layers of your belly muscles. Pain is expected, even for some as far out as 6-8 weeks after surgery. Moving, sneezing, coughing, and breathing will cause discomfort because these activities use your belly muscles.   Please see your after visit summary medication review for what pain medication will be continued, discontinued and newly started for you.    You can take opioid pain medicine if prescribed and if needed. Try to wean off from it as soon as you feel comfortable.   Do not drive while you are taking opioid pain medicine. This is dangerous.  The first three days after surgery, take your acetaminophen (Tylenol) scheduled every 6 hours.  After that you can take it as needed.  -Acetaminophen formulation options:   -Liquid   -Caplet (Cut caplet in half before taking)   -Do not take more than 3000 mg Tylenol in a 24 hour period.  -You may also take Tylenol for pain in place of the opioid pain medicine (check with your care team for specifics).   You can apply ice or heat to the affected area(s). Just remember to wrap the ice in something and limit icing sessions to 20 minutes. Excessive icing can irritate the skin or cause skin damage.  You can apply heat with a hot, wet towel or heating pad. Just like cold therapy, limit heat application to 20 minutes. Never sleep with a heating pad on. It could cause severe burns to your skin.  Wear your binder to support your belly muscles if you have one.  Take this off a little more each day  and try to be off completely by 2 weeks after surgery. If you don't need your binder for comfort or support, you don't need to wear it.   You may not be able to sleep in a comfortable position for a few weeks after surgery. This is normal. You may be more comfortable sleeping in a recliner or propped up with 3 pillows for the first couple of weeks after surgery.  You may feel gas pains in the upper chest or between your shoulder blades from the laparoscopic surgery which should get better with walking around, warm/cold packs and pain medication.  Do not take NSAID's (Non-Steroidal Anti-Inflammatory Drugs) (examples: ibuprofen, Motrin, Advil, Aleve, and Naproxen), aspirin, or use pain patches with NSAID's. They will increase your risk of bleeding or getting an ulcer.    Please call the clinic for any of the following pain concerns, we would like to talk to you:  -pain that does not improve with rest  -pain that gets worse and worse  -pain that is not controlled by your pain medicine  -a sudden severe increase in pain    What medications will I need to take after surgery?  You may be discharged with the following types of medications: antacids, pain medications, anti-nausea medications, etc.  Please see your after visit summary medication review for what will be continued, discontinued and newly started.  It is important to reduce the amount of acid in your new stomach for a couple of months after surgery while it is still healing. We will prescribe an acid reducer or antacid. Take it as directed. This will help prevent ulcers, heartburn and acid reflux.  If you took an acid reducer before you had surgery, your care team will let you know what acid reducer you will take after surgery.   It is okay to swallow any medications smaller than   inch in size.   -If it is larger than   inch, it may need to be cut, crushed, or in a liquid form. See the above medication section for what is most appropriate for you and your  medications.    What should I know about my incisions (cuts)?  Your incisions are covered with white steri strips or butterfly tape and have band aids or gauze over the top. If you have gauze or band aids, they can come off in the hospital.  Leave your steri strips on until they fall off on their own. If the steri strips don't fall off after 1 week, you can take them off. If they fall off earlier, replace them with clean band aids trying to avoid touching the incision itself.   If you have gauze covered by a clear dressing, remove 2-3 days after surgery or as directed by your care team.  You may shower in the hospital after surgery and can get your incision coverings wet.  Do not submerge in water (e.g. No baths, swimming pools, hot tubs) until your incisions are completely healed (scabs are gone).    Call the clinic if you have any of these signs or symptoms of infection:  -Redness around the site.  -Drainage that smells bad.  -Drainage that is thick yellow or green.  -An increase in pain around the incision site  -An increase in swelling around the incision site  -Heat or warmth around incision site   -Fever of 101.5  F (38.3  C) or higher when taken under the tongue.    -Chills    Will my urine or bowel movements change?   Your first bowel movements (stools) will likely be liquid. You may also notice old blood or a darker color (black or maroon color) in your bowel movements.  This is not unusual and usually goes away after the first week, if not sooner. You may not have a bowel movement for a week.   If you have not had a bowel movement for at least three days after your surgery date and are passing gas, you can use over the counter stool softeners.  Please stop taking the stool softeners and laxatives if your stools are loose.  Increasing fluids and activity as well as getting off narcotics will help prevent constipation.    Call the clinic if:   -You have stomach pain.   -You continue to have  "constipation.  -You have excessive bloating after walking and passing gas.  -You are having 3 or more loose stools a day.  You may have an infection that we need to treat.    How can I prevent dehydration if I feel nauseated (sick to my stomach) and vomit (throw up)?   Vomiting is not normal after surgery. If you continue to have nausea and vomiting, call the clinic.   Nausea can be a sign of dehydration. That is why it is very important to track your fluids. Do not nap more than one hour during the day. Set a timer to wake yourself up, if needed. Too much sleep will keep you from drinking enough fluid during the day and lead to dehydration.  No outside activity in hot, humid weather until you can drink 48 to 64 ounces of fluid in 24 hours. If you sweat a lot, your body may lose too much water.  If having difficulty getting in your fluids, try to take a 1 ounce sip of water (one medicine cup) every 15 minutes.  Set a timer to remind yourself.    If you notice any of the following symptoms, please don't delay in calling the clinic.  Early identification gives us more options for treatment:  -Dark colored urine  -Urinating (pass water) less than 2-3 times per day  -Lack of energy  -Nausea  -Dizziness  -Headache  -Metallic taste in your mouth    Call the clinic ANY TIME at [phone] if:  -Your pain medicine is not working.  -You have a fever ? 101.5 F.  -You have belly or left shoulder pain that gets worse and worse.  -You have a swollen leg with redness, warmth, or pain behind the knee or calf.  -You have chest pain   -You feel very short of breath.  -You have a sudden severe increase in heart rate.  -You have vomiting that gets worse and worse.  -You have constant nausea (feeling sick to your stomach) that does not go away with medication.  -You have trouble swallowing.  -You have an increasing feeling that \"something is not right\".  -You have hiccups that do not stop.  -You have any questions or concerns.    If " you have to go to the Emergency Room, we prefer you go to the hospital that did your surgery. Please let them know that you had bariatric surgery and to notify your surgeon.    When should I go back to the clinic?  Follow up with your care team in 1 week.   If this appointment was not already made, please call: [phone]

## 2021-06-06 NOTE — PROGRESS NOTES
Time in: 1:00p /Time out: 2:00p   Pt's Initial Weight: 277 lbs  Weight: (!) 257 lb (116.6 kg)  Weight loss from initial: 20  % Weight loss: 7.22 %    BMI: Body mass index is 42.12 kg/m .    Weight goal: at or below initial weight    Pt presents for nutrition education class for liquid diets. Educated pt on 2-week pre-op and 1-week post-op liquid diets. Discussed appropriate liquids and demonstrated portions for each of the food groupings during each diet phase. Reviewed appropriate calories/protein/fluid goals during 2-week pre-op liquid diet. Educated on correct vitamins/minerals to take after surgery in correct dosage and frequency. Provided grocery list and sample menu plan for each diet stage, as well as unflavored protein powder samples.        Pt will begin 2-week pre-op liquid diet 3/9/20, will do clear liquids the day before surgery. Pt is scheduled for LSG on 3/23/20, and will then follow 2 week post-op liquid diet. Pt will f/u with RD 1-week post-op for further diet advancement.

## 2021-06-06 NOTE — PROGRESS NOTES
I have submitted a prior authorization request on behalf of this patient to ANGELINA SALAZAR for approval of a LSG with Dr. Jameson Handy

## 2021-06-06 NOTE — PROGRESS NOTES
I was consulted by Provider, No [facility] to evaluate this pt for Bariatric Surgery.    HPI: Shameka Maurice is a 34 y.o. female here today for consideration of metabolic and bariatric surgery. she has had weight problems for most of her life, since the age of 12. she Has tried multiple weight loss programs in the past with moderate success.  40 lbs at one time  she carries most of his/her obesity in through their abdomen, and hips.   This pt does not have Hypertention.   This pt does not have GERD.   The pt does not have Sleep Apnea.   This pt does not have diabetes.    Impression Shameka Maurice is a 33 y.o. year old female with primary anovulatory infertility, pre-diabetes, history of generalized anxiety disorder, history f ADHD and morbid obesity who presents for medical bariatric consultation.      Poor functional capacity and musculoskeletal disability due to morbid obesity which satisfies NIH criteria for bariatric surgery. Her BMI is Body mass index is 45.39 kg/m ..     Shameka Maurice hopes to achieve improved energy and the ability to go hiking following surgery and significant weight loss.     Allergies:Morphine and Septra [sulfamethoxazole-trimethoprim]    Past Medical History:   Diagnosis Date     Anxiety      History of UTI      Kidney infection      Miscarriage      Ovarian cyst      Tubal pregnancy        Past Surgical History:   Procedure Laterality Date     APPENDECTOMY       DIAGNOSTIC LAPAROSCOPY Right 9/3/2014    Procedure: Laparoscopy with Right Salpingectomy;  Surgeon: Dallas Laird MD;  Location: [facility];  Service:        CURRENT MEDS:      Family History   Problem Relation Age of Onset     Breast cancer Mother      No Medical Problems Father      Breast cancer Maternal Grandmother         reports that she quit smoking about 6 months ago. She smoked 0.50 packs per day. She has never used smokeless tobacco. She reports current alcohol use. She reports  current drug use. Drug: Marijuana.    Review of Systems - 12 point Review of Systems is negative except for the issues mentioned above.    PSYCHIATRIC: she has undergone a lifestyle assessment and has been deemed a good candidate for bariatric surgery by the psychologist.    There were no vitals taken for this visit.  There is no height or weight on file to calculate BMI.    EXAM:  GENERAL: This is a well-developed 34 y.o. female who appears her stated age  HEAD & NECK: Grossly normal.  No palpable thyroid lesions  CARDIAC: RRR without murmur  CHEST/LUNG:  Clear to auscultation  ABDOMEN: Obese.  Nontender.  No hernias or masses appreciated.  LYMPHATIC:  No significant adenopathy appreciated.    EXTREMITIES: Grossly normal.  No evidence of chronic venous stasis.    NEUROLOGIC: Focally intact  INTEGUMENT: No open lesions or ulcers  PSYCHIATRIC: Normal affect. she has a good grasp on the nature of her obesity and the treatment options.    LABS:  Lab Results   Component Value Date    WBC 6.9 09/10/2019    WBC 7.8 05/18/2015    HGB 14.6 09/10/2019    HCT 42.6 09/10/2019    MCV 83 09/10/2019     09/10/2019     INR/Prothrombin Time      Lab Results   Component Value Date    HGBA1C 5.6 08/07/2019     Lab Results   Component Value Date    ALT 13 09/10/2019    AST 12 09/10/2019    ALKPHOS 43 (L) 09/10/2019    BILITOT 0.2 09/10/2019       Assessment/Plan: 34 y.o. female who is an excellent candidate for bariatric and metabolic surgery.  After a careful conversation with the patient it was decided that a  Laparoscopic Sleeve Gastrectomy. would be her best option.       I went over the surgery in detail with her.  I went over the nature of the operation and some of the potential consequences of the surgery.  I went over the expected hospital course and discussed laparoscopic versus open surgery, understanding that we will plan on doing this laparoscopically with the possibility of having to convert to an open operation.   I went over some of the risks and complications of the operation including, but not limited to, DVT, pulmonary emboli, pneumonia, postoperative bleeding, wound infection, staple line leak, intra-abdominal sepsis, and possible death.  I also went over some of the potential nutritional concerns such as vitamin B-12, iron, vitamin D, vitamin A, calcium and protein deficiencies.  I will also went over the need for lifelong nutritional surveillance.  The patient understands and wants to proceed with surgery.  We will submit for prior authorization.      Jameson Handy MD  [facility] Department of Surgery  [phone]

## 2021-06-06 NOTE — TELEPHONE ENCOUNTER
"Talked with pt and assured her that the appointment that she has for \"pre-op clearance\" is the same as a pre-op physical.  I reminded her to bring the orders for pre-op testing that were given to her yesterday at pre-op class.  Pt verbalized understanding.    Cynthia Ramirez RN, CBN  [facility]  P [phone]  F [phone]    "

## 2021-06-06 NOTE — PROGRESS NOTES
Patient attended group class to review pre-op instructions and dietary plan for upcoming surgery.     Discussed admission process and hospital course.  Pharmacy information packet given and explained. Patient was given exercises to work on post-op for maintaining muscle mass and strengthening that was created by [facility].  Bariatric quiz given to patient for a review on their own.  Discharge instructions, information card and follow up appointments given and reviewed with pt at this time and patient verbalized understanding. She will have her H&P on 3/6/2020 in [place] and do her  pre-op testing during that visit. Prescriptions for Omeprazole and Actigall sent to the patient's pharmacy to be started after surgery.      Elena Roy, RN, CBN

## 2021-06-06 NOTE — TELEPHONE ENCOUNTER
Pt has surgery scheduled with Dr. Handy on 3/23/20 and isn't sure if she needs to have a pre-op physical. Pt has an appt with her physician today at 2:30 pm and would like to know before she goes to that appt. Please call pt back at [phone].

## 2021-06-08 NOTE — PROGRESS NOTES
Called pt to discuss her upcoming surgery. Pt will needs to have her H&P updated the morning of surgery by Dr. Handy and pre-op labs done prior because she never had them done before her surgery was cancelled. She completed the CXR and EKG with her primary care in March.  I let her know that she needs to do the lab testing and she is planning on going to the [facility] lab this Thursday. She still has all of her paperwork from her education class and will get those out to review.  She is clear on her medications and we went through them all to make sure they were up to date   I renewed the scripts for Actigall and Omeprazole to start after surgery since she never picked them up the first time. Orders in the system for the labs already for Dr. Post.  I asked her to call with any further questions and she verbalized understanding.    Elena Roy RN

## 2021-06-10 NOTE — TELEPHONE ENCOUNTER
----- Message from Elizabeth Marte sent at 8/5/2020  2:29 PM CDT -----  Regarding: Nicotine test  Hi Ladies    I had to cancel Shameka's surgery back in May due to a failed nicotine test.  I had her on my calendar to check back and she states she's ready.  She would be willing to do it here at our [facility].  Can you put in an order for this please???  Let me know and I will get her scheduled downstairs

## 2021-06-10 NOTE — TELEPHONE ENCOUNTER
I have left a message for Shameka to call me to follow up on how she's doing with not smoking.  She was scheduled for surgery in May and tested positive for nicotine in her pre op labs

## 2021-06-10 NOTE — PROGRESS NOTES
Patient back on the schedule for surgery now that she is smoke free for 9/14.  Orders for labs placed along with the prescriptions she will need post-op.    EKG and Chest Xray will be good since they were done in the last 6 months.    Elena Roy RN

## 2021-06-10 NOTE — PROGRESS NOTES
I have Shameka scheduled for her LSG with Dr. Handy on 09/14/20.  She has already attended pre op class back in May.  She had her EKG and Chest Xray and per Elena Roy, those results are still good.  She will be having her pre op labs and physical done at [facility]

## 2021-06-11 NOTE — PROGRESS NOTES
"Shameka Maurice is a 34 y.o. female who is being evaluated via a billable video visit.      The patient has been notified of following:     \"This video visit will be conducted via a call between you and your physician/provider. We have found that certain health care needs can be provided without the need for an in-person physical exam.  This service lets us provide the care you need with a video conversation.  If a prescription is necessary we can send it directly to your pharmacy.  If lab work is needed we can place an order for that and you can then stop by our lab to have the test done at a later time.    Video visits are billed at different rates depending on your insurance coverage. Please reach out to your insurance provider with any questions.    If during the course of the call the physician/provider feels a video visit is not appropriate, you will not be charged for this service.\"    Patient has given verbal consent to a Video visit? Yes  How would you like to obtain your AVS? AVS Preference: E-Mail (Inform patient AVS not encrypted with this option).  If dropped by the video visit, the video invitation should be sent to: Text to cell phone: [phone]  Will anyone else be joining your video visit? No        Video Start Time: 10:38 AM    Additional provider notes: .  HPI: Pt is here for follow up of a Laparoscopic Sleeve Gastrectomy. she is doing well. Taking po well drinking 34 oz a day.. No vomiting. No fevers or chills. Ambulating without problems.   She is having some troubles with the Gummy Vitamins, they upset her stomach.      LMP 09/09/2020 9/23/2020 227  Wt Readings from Last 3 Encounters:   09/15/20 (!) 239 lb 9.6 oz (108.7 kg)   03/04/20 (!) 257 lb (116.6 kg)   02/12/20 (!) 257 lb 12.8 oz (116.9 kg)     There is no height or weight on file to calculate BMI.    EXAM:  GENERAL:Appears well  ABDOMEN: Incisions healing well      Assessment/Plan: Pt s/p Laparoscopic Sleeve Gastrectomy. " Doing well. Diet and activity discussed. she will f/u with us at the [facility] in 3 months.    Jameson Handy MD  [facility] Department of Surgery    Video-Visit Details    Type of service:  Video Visit    Video End Time (time video stopped): 10:44 AM  Originating Location (pt. Location): Home    Distant Location (provider location):  [facility] GENERAL SURGERY AND [facility]IATRICS CARE     Platform used for Video Visit: Destiny Handy  [facility]  [phone]  .

## 2021-06-11 NOTE — ANESTHESIA PREPROCEDURE EVALUATION
Anesthesia Evaluation      Patient summary reviewed   No history of anesthetic complications     Airway   Mallampati: III  Neck ROM: full   Pulmonary - normal exam    breath sounds clear to auscultation  (+) a smoker  (-) asthma, sleep apnea                         Cardiovascular - negative ROS  Exercise tolerance: > or = 4 METS  (-) hypertension  Rhythm: regular  Rate: normal,         Neuro/Psych - negative ROS   (-) no seizures    Endo/Other    (+) obesity (BMI 38),   (-) no diabetes, hypothyroidism, not pregnant     GI/Hepatic/Renal - negative ROS   (+)   chronic renal disease,   (-) GERD     Other findings:    Covid negative 9/10        Dental    (+) poor dentition and chipped                         Anesthesia Plan  Planned anesthetic: general endotracheal  Mg 4 g, Precedex gtt, ketamine 50 mg on induction (allergy to Morphine, pharmacy unable to procure preservative free hydromorphone, will defer ITN)  Propofol gtt background, scopolamine, dexamethasone 10 mg, ondansetron 4 mg  ASA 3   Induction: intravenous   Anesthetic plan and risks discussed with: patient and spouse  Anesthesia plan special considerations: video-assisted, increased risk of difficult airway, antiemetics, dexmedetomidine  Post-op plan: routine recovery

## 2021-06-11 NOTE — ANESTHESIA CARE TRANSFER NOTE
Last vitals:   Vitals:    09/14/20 1259   BP: 146/84   Pulse: 80   Resp: 16   Temp: 36.1  C (97  F)   SpO2: 100%     Patient's level of consciousness is awake  Spontaneous respirations: yes  Maintains airway independently: yes  Dentition unchanged: yes  Oropharynx: oropharynx clear of all foreign objects    QCDR Measures:  ASA# 20 - Surgical Safety Checklist: WHO surgical safety checklist completed prior to induction    PQRS# 430 - Adult PONV Prevention: 4558F - Pt received => 2 anti-emetic agents (different classes) preop & intraop  ASA# 8 - Peds PONV Prevention: NA - Not pediatric patient, not GA or 2 or more risk factors NOT present  PQRS# 424 - Madisyn-op Temp Management: 4559F - At least one body temp DOCUMENTED => 35.5C or 95.9F within required timeframe  PQRS# 426 - PACU Transfer Protocol: - Transfer of care checklist used  ASA# 14 - Acute Post-op Pain: ASA14B - Patient did NOT experience pain >= 7 out of 10

## 2021-06-11 NOTE — ANESTHESIA POSTPROCEDURE EVALUATION
Patient: Shameka Maurice  Procedure(s):  LAPAROSCOPIC SLEEVE GASTRECTOMY  Anesthesia type: general    Patient location: PACU  Last vitals:   Vitals Value Taken Time   /80 9/14/2020  2:49 PM   Temp 36.4  C (97.5  F) 9/14/2020  2:49 PM   Pulse 61 9/14/2020  2:49 PM   Resp 20 9/14/2020  2:49 PM   SpO2 94 % 9/14/2020  2:49 PM     Post vital signs: stable  Level of consciousness: awake and responds to simple questions  Post-anesthesia pain: pain controlled  Post-anesthesia nausea and vomiting: no  Pulmonary: unassisted, return to baseline  Cardiovascular: stable and blood pressure at baseline  Hydration: adequate  Anesthetic events: no    QCDR Measures:  ASA# 11 - Madisyn-op Cardiac Arrest: ASA11B - Patient did NOT experience unanticipated cardiac arrest  ASA# 12 - Madisyn-op Mortality Rate: ASA12B - Patient did NOT die  ASA# 13 - PACU Re-Intubation Rate: ASA13B - Patient did NOT require a new airway mgmt  ASA# 10 - Composite Anes Safety: ASA10A - No serious adverse event    Additional Notes:

## 2021-06-11 NOTE — TELEPHONE ENCOUNTER
Called pt to ask when/where she is having her pre-op H&P for her upcoming surgery on 9/14/2020. Pt is scheduled for tomorrow at [facility] in [place] so orders for labs faxed to [phone]. Pt is having urine for nicotine and metabolites checked and results may not be back in time for her surgery on 9/14. Pt had a negative test done on 8/6/2020 and assures me that she is not smoking. I asked her to call with any questions or concerns and she verbalized understanding.    Cynthia Ramirez RN, CBN  [facility]  P [phone]  F [phone]

## 2021-06-11 NOTE — PROGRESS NOTES
Patient called and said that she is having some trouble with some nausea and vomiting today to the point that she can get very little down.  She has been able to get a few sips of water and broth at this point and throwing up pills.  Will send some ODT Zofran to her pharmacy per Joanne Oreilly NP and Dr. Handy would like her to get 2 Liters of NS with some IV zofran 4 mg x1 and thiamine 100 mg IV x1.  [facility] was contacted and fax with orders sent over to [phone]. Will stay connected with the patient and support her during this time.  Elena Roy RN

## 2021-06-11 NOTE — TELEPHONE ENCOUNTER
Post-Op Phone Call  [facility]    Surgeon: Jameson Handy M.D.  Date of Surgery: 9/14/2020  Discharge Date: 9/15/2020    Date/Time Called:   Date: 9/17/2020 Time: 9:58 AM   Attempt: First    Pain Control:  Intensity: Mild (1 - 3)  Duration/Location/Explain: mainly at the bigger incision when she gets in and out of bed or bigger movements.  What makes it better/worse? Walking around, the tylenol and ultram once    Medications:  Narcotic Use -Yes  Drug type: Tramadol  Frequency: once last night to help her sleep    Non-prescription pain control: Tylenol    Other medications currently taking: See medication list for details - medication list reviewed    Complete Multivitamin + Iron BID? Yes    Vitamin B12? sublingual daily    Incisions:  Drainage? clean and dry  Comment: tegaderm and gauze    Intake/Output:  Fluid Intake(oz/day)? She has gotten in 25 oz so far today and will continue to focus on her fluids.  We were going to get her some IV hydration today but it seems like she won't need it if she is able to continue forward like this. Dr. Handy is okay with putting the fluids on hold for now.   Home [facility] called and the fluids cancelled.     Fluid type? Water and her [employer] protein shake    Heartburn? No  Counseling: Call if reflux, or pain with eating and drinking start - she hasn't started the omeprazole but will pick that up today with the Zofran.  Nausea? None really today, but had a lot yesterday.  It seems to have lifted.  Vomiting? Yes - just yesterday.  Not today at all.  BM? No  Gas? Yes    Voiding Frequency? 4 or more/day     Are you using your incentive spirometer? If yes, how often? No  - but she is taking deep breaths.  Encouraged her usage on this.    Any fever type symptoms? No    Walking activity? Yes  Frequency/Type: walking around the house. Encouraged more of this to help with so many other things.    In Preparation for Surgery:  On a scale of 1-5, with 5 being the highest,  how well did the pre-op class prepare you for what actually happened in the hospital? 5    Is there anything that you wish you would have known prior to surgery that you did not know? If yes, what? Not really    On a scale of 1-5, with 5 being the highest, how was the service while you were in the hospital? 5    Is/was there anyone in particular; nurse, aide, hospital staff, that did a great job and you would like us to recognize? If yes, whom. The nurses who helped to discharged her were     Would you recommend [facility] to others? Yes      Thank you for your time. Please do not hesitate to call us with any questions or concerns.    Call completed by: Elena Roy

## 2021-06-11 NOTE — TELEPHONE ENCOUNTER
Pt is scheduled for surgery on 9/14/2020 and CXR and EKG were not ordered d/t pt having them in March prior to her surgery that was cancelled at that time. The tests were done 3/6/2020 which is a little over 6 months so called  to discuss. Per , pt's EKG and CXR were both normal so there is no need to repeat prior to her surgery next week. Called [facility] to let them know.    Cynthia Ramirez RN, CBN  [facility]  P [phone]  F [phone]

## 2021-06-11 NOTE — TELEPHONE ENCOUNTER
Called patient when she was not connected for 1 week post op class at 10 am. Provided her with clinic phone number to call to make an appointment with dietitian to review the 1 week post op information individually as she has not attended the 1 week post op class last week or this week.     Alta Kelley RD

## 2021-06-12 NOTE — TELEPHONE ENCOUNTER
Left pt a message asking her to call back to discuss how she is feeling.    Cynthia Ramirez RN, CBN  [facility]  P [phone]  F [phone]

## 2021-06-12 NOTE — PROGRESS NOTES
Pt presents for 1 month post op dietitian follow up visit. Reports tolerating soft food diet well. Denies n/v, constipation/diarrhea, or significant pain. Taking MVI and Vitamin B12 appropriately.Instructed pt to begin taking 500 mg calcium citrate BID and 5000 IU Vitamin D3. Educated pt on soft to bariatric regular diets, medications, developing an exercise regimen, and other dietary points of care. Provided  Education handout on items discussed. Pt to follow up with RD at 3 months post op.     +feels as though she doesn't feel well with eating- burping after most meals  Eating oatmeal with protein and drinking fairlife    Have you been to the hospital or seen by a doctor for any reason since your surgery? no

## 2021-06-12 NOTE — TELEPHONE ENCOUNTER
Called and left a message for the patient to figure out why she isn't taking the omeprazole.  We discussed it during her post-op phone call back in the middle of September so I don't know if she never picked it up or did and stopped taking it for some reason or another.  The prescription should still be available at her [facility] in [place] but I will send another one today just to be sure.  Encouraged her to call with questions or concerns on this.  Elena Roy RN

## 2021-06-12 NOTE — TELEPHONE ENCOUNTER
Patient has a lab appt. For Covid Testing   No orders available.  Please review and place orders needed.  Thank you  Lab

## 2021-06-12 NOTE — TELEPHONE ENCOUNTER
Called pt again to check in with her. Left her a message asking her to call back.    Cynthia Ramirez RN, CBN  [facility]  P [phone]  F [phone]

## 2021-06-12 NOTE — TELEPHONE ENCOUNTER
Pt called back today. She says she is still having issues with eating. She says it's uncomfortable, she is nauseated and is very emotional. She says she called her pharmacy to refill a antiemetic that she had in the past and is planning to get started on that today. She is taking the Omeprazole daily now but doesn't feel like it's working. I told her I would like her to have a visit with  today and she verbalized understanding. I reminded her to call us if she is having issues or is struggling because we are here to help.       Cynthia Ramirez RN, CBN  [facility]  P [phone]  F [phone]

## 2021-06-12 NOTE — TELEPHONE ENCOUNTER
----- Message from Rebeka Ballard RD sent at 10/14/2020 11:27 AM CDT -----  Shameka is not taking omeprazole, it doesn't show up on her med list either. She is experiencing discomfort and burping after most meals. Would getting her started on that be helpful? Thanks!

## 2021-06-12 NOTE — TELEPHONE ENCOUNTER
Pt called in earlier today with complaints of issues with eating. She says she feels pain when she eats and is wondering if it's reflux/heartburn. She recently started taking Omeprazole and thinks it's not working. We discussed how much she is eating at a time and proper bariatric technique such as chewing well and pt says it feels uncomfortable after even one bite. I asked her to start taking her PPI two times a day for awhile to see if that helps. Pt verbalized understanding.    Cynthia Ramirez RN, CBN  [facility]  P [phone]  F [phone]

## 2021-06-12 NOTE — PROGRESS NOTES
"Shameka Maurice is a 34 y.o. female who is being evaluated via a billable video visit.      The patient has been notified of following:     \"This video visit will be conducted via a call between you and your physician/provider. We have found that certain health care needs can be provided without the need for an in-person physical exam.  This service lets us provide the care you need with a video conversation.  If a prescription is necessary we can send it directly to your pharmacy.  If lab work is needed we can place an order for that and you can then stop by our lab to have the test done at a later time.    Video visits are billed at different rates depending on your insurance coverage. Please reach out to your insurance provider with any questions.    If during the course of the call the physician/provider feels a video visit is not appropriate, you will not be charged for this service.\"    Patient has given verbal consent to a Video visit? Yes  How would you like to obtain your AVS? AVS Preference: MyChart.  If dropped by the video visit, the video invitation should be sent to: Text to cell phone: [phone]  Will anyone else be joining your video visit? No        Video Start Time: 11:11 AM    11/4/2020  Visit with: Nina Post MD, [facility] Visit  Primary Provider is Sly Esposito MD        Shameka Maurice is a 34 y.o. year old female almost 2 months s/p Sleeve Gastrectomy with Dr. Dr. Handy with the following concerns:    CC: nausea, swallowing difficulties      Assessment:  Nausea  Unclear etiology. Vomiting is rare. We will try zofran    Dysphagia, unspecified type  This could be secondary to gastritis vs stenosis. She is now taking 40 mg prilosec with no relief. She will continue you this. We will arrange for bariatric endoscopy.    Mild dehydration  Patient is able to tolerate water. She will increase her water intake to 60-80 oz per " day.       Follow up: prn failure of symptoms to improve with above measures      HPI Patient reports nausea continuously for the last couple of weeks. She wasn't taking her prilosec after surgery because she wasn't noticing any reflux. She then noticed reflux so she started taking the prilosec.. She increased her dose to 40 mg per day last week and that hasn't helped her symptoms. She feels discomfort after she eats any food, even if it is just a bite. It feels like it gets stuck. Everything makes her stomach turn except water. She is drinking 40 oz of water per day. Her urine is dark.    Fevers: no  Chills: no  Nausea: constant  Vomiting: a couple times a week  Leg Pain: no  SOB: no  Constipation: not discussed  Diarrhea: not discussed    Meals are 1/4 cup or less  Diet:B: eggs (1/2 )- causes burping and feels stuck or protein shake (causes nausea) L: watermelon, protein 1 bar D: chicken, broccoli- can't even eat a bite      Habits:            Tobacco  none   Alcohol  none   Caffeine   none   NSAIDS  none   CPAP Use  not discussed   Birth Control  not discussed               Allergies   Hydroxyzine, Morphine, Septra [sulfamethoxazole-trimethoprim], and Sulfa (sulfonamide antibiotics)  Medications     Current Outpatient Medications   Medication Sig Dispense Refill     ascorbic acid, vitamin C, 500 mg Chew chew tab Chew 500 mg daily. Do not start taking after surgery until advised by dietician in post-op class.       busPIRone (BUSPAR) 5 MG tablet Take 5 mg by mouth 2 (two) times a day.       cholecalciferol, vitamin D3, (VITAMIN D3) 5,000 unit Tab Take 1 tablet (5,000 Units total) by mouth daily. 90 tablet 3     cyanocobalamin, vitamin B-12, 1,000 mcg Subl Place 1,000 mcg under the tongue daily.       cyclobenzaprine (FLEXERIL) 10 MG tablet Take 10 mg by mouth at bedtime as needed for muscle spasms.       dextroamphetamine-amphetamine (ADDERALL) 10 mg Tab tablet Take 10 mg by mouth 2 times daily before breakfast  "and lunch. At 0700 and 1200       FLUoxetine (PROZAC) 40 MG capsule Take 40 mg by mouth daily.       LORazepam (ATIVAN) 0.5 MG tablet Take 0.5 mg by mouth daily as needed for anxiety.   1     nabumetone (RELAFEN) 500 MG tablet Take 500 mg by mouth 2 (two) times a day as needed for pain.        nystatin (MYCOSTATIN) powder Apply to affected area two times a day as needed 15 g 1     omeprazole (PRILOSEC) 20 MG capsule Take 1 capsule (20 mg total) by mouth daily before breakfast. Open capsule and sprinkle on food. 90 capsule 0     pediatric multivitamin-iron (POLY-VI-SOL WITH IRON) chewable tablet Chew 1 tablet 2 (two) times a day.        ursodioL (ACTIGALL) 300 mg capsule Take 1 capsule (300 mg total) by mouth 2 (two) times a day. Start 2 wks after surgery. Do not open capsule. Swallow whole with warm water. 180 capsule 1     No current facility-administered medications for this visit.      Problem List     Patient Active Problem List   Diagnosis     Attention deficit hyperactivity disorder (ADHD), combined type     Generalized anxiety disorder     Primary anovulatory infertility     Morbid obesity (H)     Morbid obesity due to excess calories (H)     Past Medical History     Past Medical History:   Diagnosis Date     ADHD      Anxiety      Bacterial vaginosis      History of UTI      Kidney infection      Miscarriage      Ovarian cyst      Tubal pregnancy      Surgical History     Past Surgical History:   Procedure Laterality Date     APPENDECTOMY       DIAGNOSTIC LAPAROSCOPY Right 9/3/2014    Procedure: Laparoscopy with Right Salpingectomy;  Surgeon: Dallas Laird MD;  Location: [facility];  Service:      CT LAP, JACQUES RESTRICT PROC, LONGITUDINAL GASTRECTOMY N/A 9/14/2020    Procedure: LAPAROSCOPIC SLEEVE GASTRECTOMY;  Surgeon: Jameson Handy MD;  Location: [facility];  Service: General         Examination     Ht Readings from Last 1 Encounters:   11/04/20 5' 5\" (1.651 m)     Wt Readings " "from Last 1 Encounters:   11/04/20 203 lb (92.1 kg)     Body mass index is 33.78 kg/m .  Ht 5' 5\" (1.651 m)   Wt 203 lb (92.1 kg)   BMI 33.78 kg/m    Height: 5' 5\" (1.651 m) (11/4/2020 11:00 AM)  Initial Weight: 277 lbs (11/4/2020 11:00 AM)  Weight: 203 lb (92.1 kg) (11/4/2020 11:00 AM)  Weight loss from initial: 74 (11/4/2020 11:00 AM)  % Weight loss: 26.71 % (11/4/2020 11:00 AM)  BMI (Calculated): 33.8 (11/4/2020 11:00 AM)  SpO2: 97 % (9/15/2020  7:36 AM)      GENERAL: Healthy, alert and no distress  EYES: Eyes grossly normal to inspection. No discharge or erythema, or obvious scleral/conjunctival abnormalities.  RESP: No audible wheeze, cough, or visible cyanosis.  No visible retractions or increased work of breathing.    NEURO: Cranial nerves grossly intact. Mentation and speech appropriate for age.  PSYCH: Mentation appears normal, tearful at times, judgement and insight intact, normal speech and appearance well-groomed    LABS: not done        Video-Visit Details    Type of service:  Video Visit    Video End Time (time video stopped): 11:57 AM  Originating Location (pt. Location): Home    Distant Location (provider location):  [facility] SURGERY [facility] AND BARIATRICS CARE [place]     Platform used for Video Visit: Kimberly Bowman CMA    "

## 2021-06-13 NOTE — ANESTHESIA CARE TRANSFER NOTE
Last vitals:   Vitals:    11/25/20 1400   BP: 112/70   Pulse: 66   Resp: 16   Temp: 36.3  C (97.4  F)   SpO2: 100%     Patient's level of consciousness is drowsy  Spontaneous respirations: yes  Maintains airway independently: yes  Dentition unchanged: yes  Oropharynx: oropharynx clear of all foreign objects    QCDR Measures:  ASA# 20 - Surgical Safety Checklist: WHO surgical safety checklist completed prior to induction    PQRS# 430 - Adult PONV Prevention: 4558F - Pt received => 2 anti-emetic agents (different classes) preop & intraop  ASA# 8 - Peds PONV Prevention: NA - Not pediatric patient, not GA or 2 or more risk factors NOT present  PQRS# 424 - Madisyn-op Temp Management: 4559F - At least one body temp DOCUMENTED => 35.5C or 95.9F within required timeframe  PQRS# 426 - PACU Transfer Protocol: - Transfer of care checklist used  ASA# 14 - Acute Post-op Pain: ASA14B - Patient did NOT experience pain >= 7 out of 10

## 2021-06-13 NOTE — ANESTHESIA POSTPROCEDURE EVALUATION
Patient: Shameka Maurice  Procedure(s):  ESOPHAGOGASTRODUODENOSCOPY (EGD) with biopsy  Anesthesia type: MAC    Patient location: Phase II Recovery  Last vitals:   Vitals Value Taken Time   /56 11/13/20 1500   Temp 37  C (98.6  F) 11/13/20 1439   Pulse 77 11/13/20 1507   Resp 16 11/13/20 1445   SpO2 98 % 11/13/20 1507   Vitals shown include unvalidated device data.  Post vital signs: stable  Level of consciousness: awake and responds to simple questions  Post-anesthesia pain: pain controlled  Post-anesthesia nausea and vomiting: no  Pulmonary: unassisted  Cardiovascular: stable  Hydration: adequate  Anesthetic events: no    QCDR Measures:  ASA# 11 - Madisyn-op Cardiac Arrest: ASA11B - Patient did NOT experience unanticipated cardiac arrest  ASA# 12 - Madisyn-op Mortality Rate: ASA12B - Patient did NOT die  ASA# 13 - PACU Re-Intubation Rate: NA - No ETT / LMA used for case  ASA# 10 - Composite Anes Safety: ASA10A - No serious adverse event    Additional Notes:

## 2021-06-13 NOTE — ANESTHESIA POSTPROCEDURE EVALUATION
Patient: Shameka Maurice  * No procedures listed *  Anesthesia type: No value filed.    Patient location: Labor and Delivery  Last vitals:   Vitals:    10/03/17 0115   BP: 113/69   Pulse: 88   Resp: 16   Temp: 36.5  C (97.7  F)   SpO2:      Post vital signs: stable  Level of consciousness: awake and responds to simple questions  Post-anesthesia pain: pain controlled  Post-anesthesia nausea and vomiting: no  Pulmonary: unassisted, return to baseline  Cardiovascular: stable and blood pressure at baseline  Hydration: adequate  Anesthetic events: no    QCDR Measures:  ASA# 11 - Madisyn-op Cardiac Arrest: ASA11B - Patient did NOT experience unanticipated cardiac arrest  ASA# 12 - Madisyn-op Mortality Rate: ASA12B - Patient did NOT die  ASA# 13 - PACU Re-Intubation Rate: NA - No ETT / LMA used for case  ASA# 10 - Composite Anes Safety: ASA10A - No serious adverse event    Additional Notes:

## 2021-06-13 NOTE — ANESTHESIA PREPROCEDURE EVALUATION
Anesthesia Evaluation      Patient summary reviewed   History of anesthetic complications (PONV)     Airway   Mallampati: II  Neck ROM: full   Pulmonary - negative ROS and normal exam                          Cardiovascular - negative ROS and normal exam   Neuro/Psych - negative ROS     Endo/Other    (+) obesity (BMI 32),      GI/Hepatic/Renal    (+)   chronic renal disease,      Other findings: Weight loss      Dental - normal exam                        Anesthesia Plan  Planned anesthetic: MAC    ASA 2   Induction: intravenous   Anesthetic plan and risks discussed with: patient    Post-op plan: routine recovery

## 2021-06-13 NOTE — PROGRESS NOTES
"Shameka Maurice is a 35 y.o. female who is being evaluated via a billable video visit.       The patient has been notified of following:     \"This video visit will be conducted via a call between you and your physician/provider. We have found that certain health care needs can be provided without the need for an in-person physical exam.  This service lets us provide the care you need with a video conversation.  If a prescription is necessary we can send it directly to your pharmacy.  If lab work is needed we can place an order for that and you can then stop by our lab to have the test done at a later time.    Video visits are billed at different rates depending on your insurance coverage. Please reach out to your insurance provider with any questions.    If during the course of the call the physician/provider feels a video visit is not appropriate, you will not be charged for this service.\"    Patient has given verbal consent to a Video visit? Yes  How would you like to obtain your AVS? AVS Preference: MyChart.  If dropped by the video visit, the video invitation should be sent to: Text to cell phone: [phone]  Will anyone else be joining your video visit? No    Video Start Time: 8:31 AM      Post-op Surgical Weight Loss Diet Evaluation     Assessment:  Pt presents for 3 month post-op RD visit, s/p LSG on 9-14-20 with Dr. Handy. Today we reviewed current eating habits and level of physical activity, and instructed on the changes that are required for successful bariatric outcomes.    Patient Progress: struggles with protein shakes, so discussed alternatives    Pt's Initial Weight: 277 lbs  Weight: 186 lb 9.6 oz (84.6 kg)  Weight loss from initial: 90.4  % Weight loss: 32.64 %      Body mass index is 30.12 kg/m .    Patient Active Problem List   Diagnosis     Attention deficit hyperactivity disorder (ADHD), combined type     Generalized anxiety disorder     Primary anovulatory infertility     Morbid " "obesity (H)     Morbid obesity due to excess calories (H)     Nausea     Dysphagia, unspecified type     S/P laparoscopic sleeve gastrectomy     Mild dehydration     Dysphagia     Vitamins   Multi Vit with Iron: yes  Calcium Citrate: yes  B12: yes  D3: yes     Do you experience hunger? Sometimes- will eat if hungry  Do you experience any reflux or discomfort with eating? No- still will feel if eating too much at one time- feels as though she is scared to eat too much   Nausea: sometimes  Vomiting: no  Diarrhea: no  Constipation: no  Hair loss:no    Diet Recall/Time:   Breakfast: none-  Lunch: sushi bowl with shrimp and lobster, avocado, lettuce and carrots  Dinner: hot dish with hamburger- picked at this  Couple pieces of fruit  1/2 cup milk and a couple nuts    Estimated protein intake: 20-30 grams    Estimated portion size per meals:1/4 cup/meal    Meal Duration:20 minutes- will kind of pick at meals    Fluid-meal separation:  Fluids are  30min before and 30 minutes after meals.    Fluid Intake  Water: 64oz  Caffeine: every once in a while  Alcohol: none  Carbonation: every once in a while- rare    Exercise: rented exercise equipment      PES statement:      (NC-1.4) Altered GI Function related to Alteration in gastrointestinal tract structure and/or function/ Decreased functional length of the GI tract as evidenced by Weight loss of 32.64% initial body weight; sleeve gastrectomy  (NI-5.7.1) Inadequate protein intake related to Gastric bypass causing increased nutrient needs due to malabsorption/ Decreased ability to consume sufficient protein as evidenced by Edema, poor musculature, dull skin, thin and fragile hair; and Estimated intake of protein insufficient to meet requirements    Intervention    Discussion  1. Discussed 3 month Post-Op Nutritional Guidelines for LSG  2. Recommended to consume 15-20gm protein at 3 meals daily, along with protein supplement/\"planned protein containing snack\" of 15-30gm " "protein, to reach goal of 60-80 gm protein daily.  3. Educated on post-op vitamin regimen: Multi Vit + iron 2x/day, calcium citrate 400-600 mg 2x/day, 1676-9829 mcg of Sublingual B-12 daily, and 5000 IU Vitamin D3 daily (MVI and calcium can be taken at the same time BID)  4. Reviewed lean protein sources  5. Bariatric Plate Method-  including lean/low fat protein at each meal, including a vegetable/fruit, and limiting carbohydrate intake to less than 25% of plate volume.    Instructions  1. Include 15-20gm protein at each meal, along with protein supplement/\"planned protein containing snack\" of 15-30gm protein, to reach goal of 60-80 gm protein daily.  2. Increase fluid intake to 64oz daily: choose plain or calorie/carbonation-free beverages.  3. Incorporate daily structured activity, 30-60 minutes most days of the week  4. Recommended pt to start taking: Multi Vit + iron 2x/day, calcium citrate 400-600 mg 2x/day, 9977-3408 mcg of Sublingual B-12 daily, and 5000 IU Vitamin D3 daily. (MVI and calcium can be taken at the same time)  5. Read food labels more consistently: keeping total fat grams <10, total sugar grams <10, fiber >3gm per serving.  6. Increase vegetable/fruit intake, by having a vegetable or fruit with each meal daily.  7. Practice plate method: 1/2 plate lean/low fat protein source, vegetable/fruit, <25% of plate complex carbohydrates.  8. Separate fluids 30 minutes before/after meal times.  9. Practice eating off of smaller plates/bowls, chewing to applesauce consistency, taking 20-30 minutes to eat in a calm/relaxed environment without distractions of tv/email/cell phone.    Handouts provided:  3 month Post-Op Nutritional Guidelines for LSG  Protein supplement handout    Assessment/Plan:    Pt to follow up for 6 months  post-op visit with bariatrician     Video-Visit Details    Type of service:  Video Visit    Video End Time (time video stopped): 8:50a  Originating Location (pt. Location): " Home    Distant Location (provider location):  [facility] GENERAL SURGERY AND BARIATRICS CARE     Platform used for Video Visit: Destiny Ballard RD

## 2021-06-13 NOTE — ANESTHESIA PROCEDURE NOTES
Epidural Block    Patient location during procedure: OB  Time Called: 10/2/2017 11:05 AM  Reason for Block:at surgeon's request and labor epidural  Staffing:  Performing  Anesthesiologist: DEVIN ALTMAN  Preanesthetic Checklist  Completed: patient identified, risks, benefits, and alternatives discussed, timeout performed, consent obtained, at patient's request, airway assessed, oxygen available, suction available, emergency drugs available and hand hygiene performed  Procedure  Patient position: sitting  Prep: ChloraPrep  Patient monitoring: continuous pulse oximetry, heart rate and blood pressure  Approach: midline  Location: L2-L3  Injection technique: HILDA air  Number of Attempts:1  Needle  Needle type: Tuohy   Needle gauge: 17 G     Catheter in Space: 5  Assessment  Sensory level: T10  No complications      Additional Notes:  No CSF.  No Heme.  Infusion connected after test dose negative.  No issues.  Patient tolerated well. Pump reviewed and started.  Patients vital signs stable.  No LAST.  RN was in room the whole time.

## 2021-06-13 NOTE — ANESTHESIA CARE TRANSFER NOTE
Last vitals:   Vitals:    11/13/20 1439   BP: 92/54   Pulse: 73   Resp: 16   Temp: 37  C (98.6  F)   SpO2: 100%     Patient's level of consciousness is drowsy  Spontaneous respirations: yes  Maintains airway independently: yes  Dentition unchanged: yes  Oropharynx: oropharynx clear of all foreign objects    QCDR Measures:  ASA# 20 - Surgical Safety Checklist: WHO surgical safety checklist completed prior to induction    PQRS# 430 - Adult PONV Prevention: 4558F - Pt received => 2 anti-emetic agents (different classes) preop & intraop  ASA# 8 - Peds PONV Prevention: NA - Not pediatric patient, not GA or 2 or more risk factors NOT present  PQRS# 424 - Madisyn-op Temp Management: 4559F - At least one body temp DOCUMENTED => 35.5C or 95.9F within required timeframe  PQRS# 426 - PACU Transfer Protocol: - Transfer of care checklist used  ASA# 14 - Acute Post-op Pain: ASA14B - Patient did NOT experience pain >= 7 out of 10

## 2021-06-13 NOTE — ANESTHESIA POSTPROCEDURE EVALUATION
"Patient: Shameka Maurice  Procedure(s):  ESOPHAGOGASTRODUODENOSCOPY (EGD)with dialation of sleeve gastrectomy  Anesthesia type: MAC    Patient location: Phase II Recovery  Last vitals:   Vitals Value Taken Time   /61 11/25/20 1430   Temp  11/25/20 1433   Pulse 84 11/25/20 1431   Resp  11/25/20 1433   SpO2 91 % 11/25/20 1431   Vitals shown include unvalidated device data.  Post vital signs: stable  Level of consciousness: awake and responds to simple questions  Post-anesthesia pain: pain controlled  Post-anesthesia nausea and vomiting: no  Pulmonary: unassisted, return to baseline  Cardiovascular: stable and blood pressure at baseline  Hydration: adequate  Anesthetic events: no    QCDR Measures:  ASA# 11 - Madisyn-op Cardiac Arrest: ASA11B - Patient did NOT experience unanticipated cardiac arrest  ASA# 12 - Madisyn-op Mortality Rate: ASA12B - Patient did NOT die  ASA# 13 - PACU Re-Intubation Rate: NA - No ETT / LMA used for case  ASA# 10 - Composite Anes Safety: ASA10A - No serious adverse event    Additional Notes:    Vitals:    11/23/20 1941 11/25/20 1248 11/25/20 1252 11/25/20 1400   BP:   99/58 112/70   Pulse:  68  66   Resp:  16  16   Temp:  36.3  C (97.3  F)  36.3  C (97.4  F)   TempSrc:  Temporal  Temporal   SpO2:  98%  100%   Weight: 191 lb (86.6 kg)  191 lb (86.6 kg)    Height: 5' 6\" (1.676 m)  5' 6\" (1.676 m)        "

## 2021-06-13 NOTE — ANESTHESIA PREPROCEDURE EVALUATION
Anesthesia Evaluation      Patient summary reviewed   No history of anesthetic complications     Airway   Mallampati: III  Neck ROM: full   Pulmonary - normal exam    breath sounds clear to auscultation  (+) a smoker                         Cardiovascular - negative ROS and normal exam  Exercise tolerance: good  Rhythm: regular  Rate: normal,         Neuro/Psych - negative ROS     Endo/Other    (+) obesity, pregnant     GI/Hepatic/Renal - negative ROS   (+)   chronic renal disease,      Other findings: Gravid. Denies PIH, GDM or Preeclampsia.          Dental    (+) poor dentition and chipped                       Anesthesia Plan  Planned anesthetic: epidural  Labor epidural risks and benefits discussed with patient.  All questions answered.  Consent signed.  Patient wishes to proceed.  RN present.    ASA 3     Anesthetic plan and risks discussed with: patient and spouse  Anesthesia plan special considerations: increased risk of difficult airway,   Post-op plan: routine recovery

## 2021-06-13 NOTE — ANESTHESIA PREPROCEDURE EVALUATION
Anesthesia Evaluation      Patient summary reviewed   No history of anesthetic complications     Airway   Mallampati: II  Neck ROM: full   Pulmonary - normal exam   (+) a smoker (former - quit 7/2020)                         Cardiovascular - negative ROS  Exercise tolerance: > or = 4 METS  (-) angina  Rhythm: regular        Neuro/Psych    (+) depression, anxiety/panic attacks,     Comments: ADHD    Endo/Other    (+) obesity,   (-) not pregnant     GI/Hepatic/Renal      Comments: Hx of bariatric surgery - 9/2020  Dysphagia - unable to eat solids for weeks     Other findings:     NPO > 24 hrs      Dysphagia 11/09/2020    Nausea 11/04/2020    Dysphagia, unspecified type 11/04/2020    S/P laparoscopic sleeve gastrectomy 11/04/2020    Mild dehydration 11/04/2020    Morbid obesity due to excess calories (H) 09/14/2020    Morbid obesity (H) 02/27/2020    Generalized anxiety disorder 07/16/2019    Attention deficit hyperactivity disorder (ADHD), combined type 11/07/2015    Primary anovulatory infertility          Results for LUCÍA VAIL (MRN [id number]) as of 11/25/2020 11/23/2020 15:42  SARS-CoV-2 PCR Result: NEGATIVE      Results for LUCÍA VAIL (MRN [id number]) as of 11/25/2020 11/25/2020 12:42  POC Preg, Urine: Negative        Dental - normal exam                        Anesthesia Plan  Planned anesthetic: MAC      Propofol gtt  Robinul  Lidocaine  Zofran, decadron 4 mg    ASA 2     Anesthetic plan and risks discussed with: patient  Anesthesia plan special considerations: antiemetics,   Post-op plan: routine recovery

## 2021-06-15 NOTE — TELEPHONE ENCOUNTER
----- Message from Rebeka Ballard RD sent at 12/15/2020  8:55 AM CST -----  Regarding: labs in house  Shameka has her 6 month post op with Dr. Post in March. She will do her labs in house. +She may be moving, so she will let us know if lab location changes.

## 2021-06-16 NOTE — PROGRESS NOTES
"  Bariatric Care Clinic Follow Up Visit for Previous Bariatric Surgery   Date of visit: 4/6/2021  Physician: Nina Post MD  Primary Care is Sly Esposito MD.  Shameka Maurice   35 y.o.  female    Date of Surgery: 9/14/20  Initial Weight: 377#  Initial BMI: 45.39  Today's Weight:   Wt Readings from Last 1 Encounters:   04/06/21 147 lb (66.7 kg)     Body mass index is 23.73 kg/m .  Initial Weight: 277 lbs  Weight: 147 lb (66.7 kg)  Weight loss from initial: 130  % Weight loss: 46.93 %  Weight (Patient Reported): 147 lb (66.7 kg)  Height (Patient Reported): 5' 6\" (1.676 m)  BMI (Based on Pt Reported Ht/Wt): 23.73       Assessment and Plan   Assessment: Shameka is a 35 y.o. year old female who is 6 months s/p  Sleeve Gastrectomy with Dr. Handy.  They have had a durable weight loss of 130 lbs since surgery.  Overall compliance with the [facility] Bariatric Surgery Program has been excellent.        Plan:    1. Postgastrectomy malabsorption  Patient is not taking all vitamins as directed.  Routine postoperative labs were ordered. She was reminded to slow down, chew foods thoroughly, and to avoid liquids within 30 minutes of solids. Written information was given.     2. Nausea  Unfortunately patient went off of her birth control and got pregnant earlier than we would like after bariatric surgery. Patient struggled with nausea prior to becoming pregnant but it has gotten worse. She is not able to eat much. She is not vomiting.  I would like her to restart the omeprazole if ok with her OB. She will continue the zofran. She will meet with our dietician to brainstorm ideas for sneaking protein in as well as healthy, nutrient dense foods to help support her pregnancy.     She will follow up with our dietician next available and with myself in 3 months.    Total time spent on the date of this encounter doing: chart review, review of test results, patient visit, physical exam, education, counseling, " developing plan of care and documenting = 74 minutes.     Bariatric Surgery Review   Interim History/LifeChanges: Patient is 9 weeks pregnant. She was nauseated before pregnancy. She is aware that she was not supposed to get pregnant for 18 months after surgery. She is not vomiting. She is drinking about 30 oz per day. She thinks she has lost 10# in the past month. Her urine is not dark    Patient Concerns: severe nausea    Hunger 1-10: 1    GERD no symptoms that she is aware of    Medication changes: She took the omeprazole for only a week because she thought it didn't help. She takes zofran daily    Vitamin Intake:   Multivitamin   prenatal vitamin 1-2 per day   Vitamin D  none   Calcium  none   Vit. B-12    none     Habits:            Alcohol Intake  none   NSAID Use  tylenol only   Caffeine Use  none   Exercise  some walking   CPAP Use:  na   Birth Control  stopped    Tobacco Use     no     No meals- she eats bites of food throughout the day. She is eating some cheese, yogurt, vegetables. She doesn't feel good when she eats meat. She has developed an aversion to protein shakes.                                  LABS: ordered    DEXA: na    LABS:  Lab Results   Component Value Date    WBC 8.1 05/21/2020    HGB 14.0 05/21/2020    HCT 41.2 05/21/2020    MCV 86 05/21/2020     05/21/2020      Lab Results   Component Value Date    VLKPVQPE91KM 22.5 (L) 08/07/2019    Lab Results   Component Value Date    HGBA1C 5.6 08/07/2019      Lab Results   Component Value Date    CHOL 198 08/07/2019    Lab Results   Component Value Date     (H) 09/10/2019         Lab Results   Component Value Date    FERRITIN 59 09/10/2019      Lab Results   Component Value Date    HDL 45 (L) 08/07/2019      Lab Results   Component Value Date    CIQJYHZO72 469 09/10/2019    No results found for: 32453   Lab Results   Component Value Date    LDLCALC 123 08/07/2019    Lab Results   Component Value Date    TSH 2.09 08/07/2019    Lab  Results   Component Value Date    FOLATE 9.9 09/10/2019      Lab Results   Component Value Date    TRIG 148 08/07/2019    Lab Results   Component Value Date    ALT 14 05/21/2020    AST 15 05/21/2020    ALKPHOS 28 (L) 05/21/2020    BILITOT 0.4 05/21/2020    No results found for: TESTOSTERONE     No components found for: CHOLHDL No results found for: 7597   @resusfast(vitamin a: 1)@             Patient Profile   Social History     Social History Narrative    Lives with  and son.  Works as manager at [employer].        Past Medical History   Past Medical History:   Diagnosis Date     ADHD      Anxiety      Bacterial vaginosis      History of UTI      Kidney infection      Miscarriage      Ovarian cyst      Tubal pregnancy      Patient Active Problem List   Diagnosis     Attention deficit hyperactivity disorder (ADHD), combined type     Generalized anxiety disorder     Primary anovulatory infertility     Morbid obesity (H)     Morbid obesity due to excess calories (H)     Nausea     Dysphagia, unspecified type     S/P laparoscopic sleeve gastrectomy     Mild dehydration     Dysphagia     Current Outpatient Medications   Medication Sig Note     ascorbic acid, vitamin C, 500 mg Chew chew tab Chew 500 mg daily. Do not start taking after surgery until advised by dietician in post-op class.      busPIRone (BUSPAR) 5 MG tablet Take 5 mg by mouth 2 (two) times a day.      cholecalciferol, vitamin D3, (VITAMIN D3) 5,000 unit Tab Take 1 tablet (5,000 Units total) by mouth daily.      cyanocobalamin, vitamin B-12, 1,000 mcg Subl Place 1,000 mcg under the tongue daily.      cyclobenzaprine (FLEXERIL) 10 MG tablet Take 10 mg by mouth at bedtime as needed for muscle spasms.      dextroamphetamine-amphetamine (ADDERALL) 10 mg Tab tablet Take 10 mg by mouth 2 times daily before breakfast and lunch. At 0700 and 1200 9/14/2020: Patient has been taking 20 mg in the morning instead of 10 mg in the morning and 10 mg at  "noon     LORazepam (ATIVAN) 0.5 MG tablet Take 0.5 mg by mouth daily as needed for anxiety.       metroNIDAZOLE (METROGEL) 0.75 % vaginal gel INSERT 1 APPLICATORFUL VAGINALLY AT BEDTIME FOR 5 DAYS      nabumetone (RELAFEN) 500 MG tablet Take 500 mg by mouth 2 (two) times a day as needed for pain.       nystatin (MYCOSTATIN) powder Apply to affected area two times a day as needed      ondansetron (ZOFRAN) 4 MG tablet Take 1 tablet (4 mg total) by mouth daily as needed for nausea.      pediatric multivitamin-iron (POLY-VI-SOL WITH IRON) chewable tablet Chew 1 tablet 2 (two) times a day.       prenatal no115-iron-folic acid 29 mg iron- 1 mg Chew Chew daily.        Past Surgical History  She has a past surgical history that includes Appendectomy; Diagnostic laparoscopy (Right, 9/3/2014); pr lap, dee restrict proc, longitudinal gastrectomy (N/A, 9/14/2020); pr esophagogastroduodenoscopy transoral diagnostic (N/A, 11/13/2020); and pr esophagogastroduodenoscopy transoral diagnostic (N/A, 11/25/2020).     Examination   Ht 5' 6\" (1.676 m)   Wt 147 lb (66.7 kg)   LMP 09/09/2020   BMI 23.73 kg/m    Height: 5' 6\" (1.676 m) (4/6/2021  2:00 PM)  Initial Weight: 277 lbs (4/6/2021  2:00 PM)  Weight: 147 lb (66.7 kg) (4/6/2021  2:00 PM)  Weight loss from initial: 130 (4/6/2021  2:00 PM)  % Weight loss: 46.93 % (4/6/2021  2:00 PM)  BMI (Calculated): 23.7 (4/6/2021  2:00 PM)  SpO2: 98 % (11/25/2020  3:00 PM)    GENERAL: Healthy, tired appearing, gagging   EYES: Eyes grossly normal to inspection. No discharge or erythema, or obvious scleral/conjunctival abnormalities.  RESP: No audible wheeze, cough, or visible cyanosis.  No visible retractions or increased work of breathing.    NEURO: Cranial nerves grossly intact. Mentation and speech appropriate for age.  PSYCH: Mentation appears normal         Counseling:   We reviewed the important post op bariatric recommendations:  -eating 3 meals daily  -eating protein first, getting >60gm " protein daily  -eating slowly, chewing food well  -avoiding/limiting calorie containing beverages  -drinking water 15-30 minutes before or after meals  -choosing wheat, not white with breads, crackers, pastas, harvey, bagels, tortillas, rice  -limiting restaurant or cafeteria eating to twice a week or less    We discussed the importance of restorative sleep and stress management in maintaining a healthy weight.  We discussed the National Weight Control Registry healthy weight maintenance strategies and ways to optimize metabolism.  We discussed the importance of physical activity including cardiovascular and strength training in maintaining a healthier weight.  We discussed the importance of life-long vitamin supplementation and life-long  follow-up.    Shameka was reminded that, to avoid marginal ulcers she should avoid tobacco at all, alcohol in excess, caffeine in excess, and NSAIDS (unless indicated for cardioprotection or othewise and opposed by a PPI).    LANIE Post MD  [facility].  4/6/2021  1:18 PM      Much or all of the text in this note was generated through the use of Dragon Dictate voice-to-text software. Errors in spelling or words which seem out of context are unintentional. Sound alike errors, in particular, may have escaped editing.        No images are attached to the encounter.

## 2021-06-16 NOTE — PROGRESS NOTES
"Shameka Maurice is a 35 y.o. female who is being evaluated via a billable video visit.       The patient has been notified of following:     \"This video visit will be conducted via a call between you and your physician/provider. We have found that certain health care needs can be provided without the need for an in-person physical exam.  This service lets us provide the care you need with a video conversation.  If a prescription is necessary we can send it directly to your pharmacy.  If lab work is needed we can place an order for that and you can then stop by our lab to have the test done at a later time.    Video visits are billed at different rates depending on your insurance coverage. Please reach out to your insurance provider with any questions.    If during the course of the call the physician/provider feels a video visit is not appropriate, you will not be charged for this service.\"    Patient has given verbal consent to a Video visit? Yes  How would you like to obtain your AVS? AVS Preference: MyChart.  If dropped by the video visit, the video invitation should be sent to: Send to e-mail at: [email or web address]  Will anyone else be joining your video visit? No      Video Start Time: 2:30p    Pregnancy in Surgical Weight Loss Diet Evaluation     Assessment:  Pt, presents for pregnancy s/p LSG on 9-14-20 with Dr. Handy. Today we reviewed current eating habits, physical activity, and discussed nutrition recommendations for a healthy pregnancy.    Patient Progress: struggled with nausea- feeling a lot better this week, eating a bit more and eating protein when able- having a hard time with meat- so discussed alternatives    Estimated Date of Delivery: 11/5/21  Gestational age: blank   No episode found   Could not be calculated   Current weight change since start of pregnancy: 7-10lb  Pt's Initial Weight: 277 lbs  Weight: 157 lb (71.2 kg)  Weight loss from initial: 120  % Weight loss: 43.32 " "%  Weight (Patient Reported): 147 lb (66.7 kg)  Height (Patient Reported): 5' 6\" (1.676 m)  BMI (Based on Pt Reported Ht/Wt): 23.73      Recommended Weight Gain During Pregnancy  Prepregnancy BMI 18.5-24.9: 25-35 lbs (11.5-16 kg)  Prepregnancy BMI 25-29.9:15-25 lbs (7-11.5 kg)  Prepregnancy BMI > or = 30: 11-18 lbs (5-9 kg)    Weight Hx from previous pregnancies:  Total Weight Gain:    Patient Active Problem List   Diagnosis     Attention deficit hyperactivity disorder (ADHD), combined type     Generalized anxiety disorder     Primary anovulatory infertility     Morbid obesity (H)     Morbid obesity due to excess calories (H)     Nausea     Dysphagia, unspecified type     S/P laparoscopic sleeve gastrectomy     Mild dehydration     Dysphagia       Estimated RMR ([place]-St Jeor equation):   1426 kcals x 1.2 (sedentary) = 1600 kcals (for weight maintenance)    Additional 340 calories in 2nd trimester for normal prepregnancy BMI  Additional 450 calories in 3rd trimester for normal prepregnancy BMI    Vitamins   Prenatal Vit with Iron: yes  Calcium Citrate: no  B12: yes  D3: no    Constipation: yes- yogurt and cheese  Hair loss:yes    Diet Recall/Time:   Breakfast: fruit and yogurt, couple sips of coffee  Lunch: norm lizandro veggies  Dinner: 2 chicken nuggets  +snacking more in the evenings    Typical Snacks: nuts, cheese, yogurt, fish, protein bars    Proteins/Veg/Fruits/CHO (NOT well tolerated): states she can't really eat meat- dense- full and uncomfortable  Eggs- gas     Estimated protein intake: 20-30 grams    Fluid-meal separation:  Fluids are  30min before and 30 minutes after meals.    Beverages: 64+oz water    Exercise: not discussed    Nutrition Diagnosis (PES statement):      (NC-1.4) Altered GI Function related to Alteration in gastrointestinal tract structure and/or function/ Decreased functional length of the GI tract as evidenced by Weight loss of 43% initial body weight; sleeve " "gastrectomy  (NB-1.7) Undesirable food choices related to failure to adjust for lifestyle changes as evidenced by low protein intake at meals; large portions; skipping meals; frequent grazing;  mindless eating ; drinking with meals, not chewing each bite to applesauce consistency; consuming caffeine/carbonation daily, frequent restaurant intake; and no structured activity regimen  (NI-5.7.1) Inadequate protein intake related to Gastric bypass causing increased nutrient needs due to malabsorption/ Decreased ability to consume sufficient protein as evidenced by Edema, poor musculature, dull skin, thin and fragile hair; and Estimated intake of protein insufficient to meet requirements    Intervention    Discussion  1. Recommended to consume 15-20gm protein at 3 meals daily, along with protein supplement/\"planned protein containing snack\" of 15-30gm protein, to reach goal of 60-80 gm protein daily.  2. Educated on post-op vitamin regimen: Prenatal Vit + iron 2x/day, calcium citrate 400-600 mg 2x/day, 5662-0952 mcg of Sublingual B-12 daily, and 5000 IU Vitamin D3 daily (MVI and calcium can be taken at the same time BID)  3. Reviewed lean protein sources  4. Bariatric Plate Method-  including lean/low fat protein at each meal, including a vegetable/fruit, and limiting carbohydrate intake to less than 25% of plate volume.   Instructions  1. Include 15-20gm protein at each meal, along with protein supplement/\"planned protein containing snack\" of 15-30gm protein, to reach goal of 60-80 gm protein daily.  2. Increase fluid intake to 64oz daily: choose plain or calorie/carbonation-free beverages.  3. Incorporate daily structured activity, 30-60 minutes most days of the week  4. Recommended pt to start taking: Pre Vit + iron 2x/day, calcium citrate 400-600 mg 2x/day, 2964-1800 mcg of Sublingual B-12 daily, and 5000 IU Vitamin D3 daily. (MVI and calcium can be taken at the same time)  5. Read food labels more consistently: " keeping total fat grams <10, total sugar grams <10, fiber >3gm per serving.  6. Increase vegetable/fruit intake, by having a vegetable or fruit with each meal daily.  7. Practice plate method: 1/2 plate lean/low fat protein source, vegetable/fruit, <25% of plate complex carbohydrates.  8. Separate fluids 30 minutes before/after meal times.  9. Practice eating off of smaller plates/bowls, chewing to applesauce consistency, taking 20-30 minutes to eat in a calm/relaxed environment without distractions of tv/email/cell phone.    Assessment/Plan:    Pt to follow up in 3 months with bariatrician and 1 month with RD    Video-Visit Details    Type of service:  Video Visit    Video End Time (time video stopped): 3:00p  Originating Location (pt. Location): Home    Distant Location (provider location):  [facility]     Platform used for Video Visit: Kimberly Ballard RD

## 2021-06-16 NOTE — TELEPHONE ENCOUNTER
Prior Authorization Request  Who s requesting:  Pharmacy  Pharmacy Name and Location: [facility], [place]  Medication Name: Ondansetron  Insurance Plan: Blue Advantage  Insurance Member ID Number:  [id number]  CoverMyMeds Key: N/A  Informed patient that prior authorizations can take up to 10 business days for response:   Yes  Okay to leave a detailed message: Yes

## 2021-06-16 NOTE — PROGRESS NOTES
Shameka THURMAN Josh Maurice is 35 y.o.  female who presents for a billable video visit today.    How would you like to obtain your AVS? MyChart.  If dropped from the video visit, the video invitation should be resent by: Text to cell phone: [phone]  Will anyone else be joining your video visit? No      Video Start Time: 3:40 pm      Video-Visit Details    Type of service:  Video Visit    Video End Time (time video stopped): 4:20 PM  Originating Location (pt. Location): Home    Distant Location (provider location):  [facility] [place]     Platform used for Video Visit: DLC

## 2021-06-16 NOTE — TELEPHONE ENCOUNTER
Central PA team  [phone]  Pool: HE PA MED ([id number])          PA has been initiated.       PA form completed and faxed insurance via Cover My Meds     Key:  [id number]     Medication:  Ondansetron 4MG dispersible tablets    Insurance:  [place]        Response will be received via fax and may take up to 5-10 business days depending on plan

## 2021-06-16 NOTE — PATIENT INSTRUCTIONS - HE
PREGNANCY AFTER BARIATRIC SURGERY      Congratulations! We share your wander and excitement and are happy to be a part of your health care team. Because of your previous bariatric surgery there are a few special considerations as you go through your pregnancy.      Once pregnancy has been established, consultation should be scheduled early in the first trimester with the OB/GYN/Primary Care Physician and Bariatric clinic.    Your bariatric team will provide medical, educational, nutritional, and patient support throughout each trimester.      IMPORTANT REMINDERS      Continue routine post-op vitamins and protein intake    B-12 SL daily or by injection once or twice each month    2 Multivitamins-each containing 18mg iron* daily    Vitamin D 5,000U daily    Calcium citrate daily    60 grams of protein daily    *One or both multivitamins may be substituted with a prenatal vitamin. This is optional but not necessary. Prenatal vitamins are typically higher in iron and folate.      Reminder: **Do NOT do the traditional Glucose Tolerance Test. Instead, check 2hr post prandial glucose. It should be less than 120. This will be communicated to your OB/GYN/Primary Provider      Bariatrician     Will order your bariatric labs if not done recently and adjust vitamin supplementation if necessary      Will answer any questions you might have regarding pregnancy after bariatric surgery    Dietitian   Will provide nutritional education and support for healthy weight gain during your pregnancy. You will typically see her once each trimester and as needed.

## 2021-06-17 NOTE — PROGRESS NOTES
"Shameka Maurice is a 35 y.o. female who is being evaluated via a billable video visit.       The patient has been notified of following:     \"This video visit will be conducted via a call between you and your physician/provider. We have found that certain health care needs can be provided without the need for an in-person physical exam.  This service lets us provide the care you need with a video conversation.  If a prescription is necessary we can send it directly to your pharmacy.  If lab work is needed we can place an order for that and you can then stop by our lab to have the test done at a later time.    Video visits are billed at different rates depending on your insurance coverage. Please reach out to your insurance provider with any questions.    If during the course of the call the physician/provider feels a video visit is not appropriate, you will not be charged for this service.\"    Patient has given verbal consent to a Video visit? Yes  How would you like to obtain your AVS? AVS Preference: MyChart.  If dropped by the video visit, the video invitation should be sent to: Send to e-mail at: [email or web address]  Will anyone else be joining your video visit? No        Video Start Time: 2:08 PM    Pregnancy in Surgical Weight Loss Diet Evaluation     Assessment:  Pt, presents for pregnancy s/p LSG on 11-5-21 with Dr. Handy. Today we reviewed current eating habits, physical activity, and discussed nutrition recommendations for a healthy pregnancy.    Patient Progress: patient states she is feeling a lot better than she was before, getting more food   Estimated Delivery Date: Estimated Date of Delivery: 11/5/21  Current weight change since start of pregnancy: loss of 4lb  Pt's Initial Weight: 277 lbs  Weight: 153 lb (69.4 kg)  Weight loss from initial: 124  % Weight loss: 44.77 %      Recommended Weight Gain During Pregnancy  Prepregnancy BMI 18.5-24.9: 25-35 lbs (11.5-16 kg)  Prepregnancy BMI " "25-29.9:15-25 lbs (7-11.5 kg)  Prepregnancy BMI > or = 30: 11-18 lbs (5-9 kg)    Total Weight Gain:    Patient Active Problem List   Diagnosis     Attention deficit hyperactivity disorder (ADHD), combined type     Generalized anxiety disorder     Primary anovulatory infertility     Morbid obesity (H)     Morbid obesity due to excess calories (H)     Nausea     Dysphagia, unspecified type     S/P laparoscopic sleeve gastrectomy     Mild dehydration     Dysphagia       Diabetes/Gestational Diabetes    Estimated RMR ([place]-St Jeor equation):   1418 kcals   Additional 340 calories in 2nd trimester for normal prepregnancy BMI  Additional 450 calories in 3rd trimester for normal prepregnancy BMI    Vitamins   Prenatal Vit with Iron: yes  Calcium Citrate: yes  B12: yes  D3: yes    Do you experience hunger? yes  Do you experience any reflux or discomfort with eating? no  Nausea: no  Vomiting: no  Diarrhea: no  Constipation: no  Hair loss:no    Diet Recall/Time:   Breakfast: cereal   Lunch: market fresh sandwich from [employer]  Dinner: perdomo and shrimp ghislaine with green beans    Typical Snacks: spicy chips   +states she is having cravings- eating a lot cheese    Estimated protein intake: 60-80 grams    Fluid-meal separation:  Fluids are  30min before and 30 minutes after meals.    Beverages: 64oz    Exercise: walking    Nutrition Diagnosis (PES statement):      (NC-1.4) Altered GI Function related to Alteration in gastrointestinal tract structure and/or function/ Decreased functional length of the GI tract as evidenced by Weight loss of 44.7% initial body weight;  sleeve gastrectomy    Intervention    Discussion  1. Recommended to consume 15-20gm protein at 3 meals daily, along with protein supplement/\"planned protein containing snack\" of 15-30gm protein, to reach goal of 60-80 gm protein daily.  2. Educated on post-op vitamin regimen: Prenatal Vit + iron 2x/day, calcium citrate 400-600 mg 2x/day, 0181-4893 mcg of " Sublingual B-12 daily, and 5000 IU Vitamin D3 daily (MVI and calcium can be taken at the same time BID)  3. Reviewed lean protein sources  Instructions  1. Increase calories by about 340 by adding in 1-2 snacks with healthy carb and protein.      Assessment/Plan:    Pt to follow up in 2 with bariatrician and 3 with RD    Video-Visit Details    Type of service:  Video Visit    Video End Time (time video stopped): 2:25p  Originating Location (pt. Location): Home    Distant Location (provider location):  [facility]     Platform used for Video Visit: Kimberly Ballard RD

## 2021-06-17 NOTE — TELEPHONE ENCOUNTER
Telephone Encounter by Kriss Camarillo at 4/20/2021  8:27 AM     Author: Kriss Camarillo Service: -- Author Type: --    Filed: 4/20/2021  8:28 AM Encounter Date: 4/12/2021 Status: Signed    : Kriss Camarillo APPROVED:    Approval start date: 01/16/2021  Approval end date:  04/16/2022    Pharmacy has been notified of approval and will contact patient when medication is ready for pickup.

## 2021-06-19 NOTE — LETTER
Letter by Kriss Mills MD at      Author: Kriss Mills MD Service: -- Author Type: --    Filed:  Encounter Date: 8/9/2019 Status: (Other)

## 2021-06-19 NOTE — LETTER
Letter by Kriss Mills MD at      Author: Kriss Mills MD Service: -- Author Type: --    Filed:  Encounter Date: 8/7/2019 Status: (Other)         Shameka Maurice  [address] [place] [address]             August 7, 2019         Dear Ms. Josh Maurice,    Below are the results from your recent visit:    Resulted Orders   Electrocardiogram Perform and Read   Result Value Ref Range    SYSTOLIC BLOOD PRESSURE  mmHg    DIASTOLIC BLOOD PRESSURE  mmHg    VENTRICULAR RATE 57 BPM    ATRIAL RATE 57 BPM    P-R INTERVAL 156 ms    QRS DURATION 90 ms    Q-T INTERVAL 394 ms    QTC CALCULATION (BEZET) 383 ms    P Axis 47 degrees    R AXIS 54 degrees    T AXIS 47 degrees    MUSE DIAGNOSIS       Sinus bradycardia  Otherwise normal ECG  No previous ECGs available  Confirmed by WENDY DOAN, FLORESITA LOC:SJ ([id number]) on 8/7/2019 4:27:34 PM         Here is the formal result from your EKG, which shows slightly decreased heart rate, but otherwise normal.      Please call with questions or contact us using [employer].    Sincerely,        Electronically signed by Kriss Mills MD

## 2021-06-19 NOTE — LETTER
Letter by Kriss Mills MD at      Author: Kriss Mills MD Service: -- Author Type: --    Filed:  Encounter Date: 8/9/2019 Status: (Other)         Shameka Maurice  [address] [place] [address]             August 12, 2019         Dear Ms. Josh Maurice,    Below are the results from your recent visit:    Resulted Orders   Comprehensive Metabolic Panel   Result Value Ref Range    Sodium 139 136 - 145 mmol/L    Potassium 4.7 3.5 - 5.0 mmol/L    Chloride 104 98 - 107 mmol/L    CO2 23 22 - 31 mmol/L    Anion Gap, Calculation 12 5 - 18 mmol/L    Glucose 78 70 - 125 mg/dL    BUN 14 8 - 22 mg/dL    Creatinine 0.74 0.60 - 1.10 mg/dL    GFR MDRD Af Amer >60 >60 mL/min/1.73m2    GFR MDRD Non Af Amer >60 >60 mL/min/1.73m2    Bilirubin, Total 0.2 0.0 - 1.0 mg/dL    Calcium 9.6 8.5 - 10.5 mg/dL    Protein, Total 7.0 6.0 - 8.0 g/dL    Albumin 3.9 3.5 - 5.0 g/dL    Alkaline Phosphatase 32 (L) 45 - 120 U/L    AST 19 0 - 40 U/L    ALT 19 0 - 45 U/L    Narrative    Fasting Glucose reference range is 70-99 mg/dL per  American Diabetes Association (ADA) guidelines.   Glycosylated Hemoglobin A1c   Result Value Ref Range    Hemoglobin A1c 5.6 3.5 - 6.0 %   HM2(CBC w/o Differential)   Result Value Ref Range    WBC 6.9 4.0 - 11.0 thou/uL    RBC 5.14 3.80 - 5.40 mill/uL    Hemoglobin 14.4 12.0 - 16.0 g/dL    Hematocrit 44.1 35.0 - 47.0 %    MCV 86 80 - 100 fL    MCH 28.0 27.0 - 34.0 pg    MCHC 32.6 32.0 - 36.0 g/dL    RDW 12.9 11.0 - 14.5 %    Platelets 308 140 - 440 thou/uL    MPV 8.1 7.0 - 10.0 fL   Insulin Assay   Result Value Ref Range    Insulin, Serum 11.7 3 - 25 mU/L      Comment:        Performed and/or entered by:  [facility]  [address] [place] [address]    Lipid Profile   Result Value Ref Range    Triglycerides 148 <=149 mg/dL    Cholesterol 198 <=199 mg/dL    LDL Calculated 123 <=129 mg/dL    HDL Cholesterol 45 (L) >=50 mg/dL    Patient Fasting > 8hrs? Yes    Thyroid  Cascade   Result Value Ref Range    TSH 2.09 0.30 - 5.00 uIU/mL   Vitamin D, Total (25-Hydroxy)   Result Value Ref Range    Vitamin D, Total (25-Hydroxy) 22.5 (L) 30.0 - 80.0 ng/mL    Narrative    Deficiency <10.0 ng/mL  Insufficiency 10.0-29.9 ng/mL  Sufficiency 30.0-80.0 ng/mL  Toxicity (possible) >100.0 ng/mL       Hi Shameka, here are your recent test results which are all ok other than the low vitamin D.  I did leave a voicemail for you to call back as well.  I see you have an appointment with bariatric surgery, but if you did want to start phentermine in the meantime, like we discussed, please call me at [phone] to discuss if you haven't already done so.      Please call with questions or contact us using [employer].    Sincerely,        Electronically signed by Kriss Mills MD

## 2021-06-19 NOTE — LETTER
Letter by Kriss Mills MD at      Author: Kriss Mills MD Service: -- Author Type: --    Filed:  Encounter Date: 7/22/2019 Status: (Other)         Shameka Moreno Josafat  [address]  [place] [address]             July 22, 2019         Dear Ms. Josh Simeonjanis,    Below are the results from your recent visit:    Chlamydia trachomatis & Neisseria gonorrhoeae, Amplified Detection   Result Value Ref Range    Chlamydia trachomatis, Amplified Detection Negative Negative    Neisseria gonorrhoeae, Amplified Detection Negative Negative       Gonorrhea and chlamydia STD screening is negative (that's good!).      Please call with questions or contact us using [employer].    Sincerely,        Electronically signed by Kriss Mills MD

## 2021-06-19 NOTE — LETTER
Letter by Kriss Mills MD at      Author: Kriss Mills MD Service: -- Author Type: --    Filed:  Encounter Date: 7/22/2019 Status: (Other)         Shameka Maurice  [address] [place] [address]               July 29, 2019      Dear Shameka:    The results of your most recent Pap smear are normal. This means that no cancerous or precancerous cells were seen. We recommend that you come back in 5 years for your next routine Pap smear.    Please call with questions or contact us using [employer].    Sincerely,        Kriss Mills MD

## 2021-06-19 NOTE — LETTER
Letter by Nina Post MD at      Author: Nina Post MD Service: -- Author Type: --    Filed:  Encounter Date: 8/8/2019 Status: (Other)         8/8/2019      Shameka Moreno Ginasirisha  [address] [place] [address]      Dear Petr Myles and thank you for your interest in the bariatric program at [facility]!     Your appointment is scheduled at our [place] Office on Tuesday September 10, 2019 at 11:00 AM with Dr. ALNIE Post.  We ask that you arrive 45 minutes prior to your appointment time to complete registration and visit prep with our nursing team.   We strive to avoid clinic delays for our patients, so patients arriving late will need to reschedule.    Your first appointment will take about two hours.     In preparation for this appointment you will need to bring the following:      Your insurance card and photo identification    Complete your assigned health history form in [employer]. If you do not have internet access, a paper form is enclosed. Please makes sure that you complete the form and bring with (if paper) because there will not be time to complete this in the office and we will need to reschedule your appointment if you forget to do this.     All your medications in their original containers, including over-the-counter medications.    Any lab results that you have had done within the last 6 months.     If you are interested in our surgical program; call your insurance company prior to this visit, to verify that your specific insurance plan covers Weight-Loss Surgery and what your out-of-pocket costs may be.     Your appointment is scheduled at our [place] Office- [facility], [address], [place] [address].  [phone].    If you find yourself unable to keep this appointment, please call us to reschedule so we can accommodate other patients. We actively call patients on our Waitlist, your communication helps us reduce appointment wait times.      We are excited that you have chosen our program and look forward to serving you!    Sincerely,  [facility] Bariatric Team

## 2021-07-03 NOTE — ADDENDUM NOTE
Addendum Note by Isela Tovar MD at 9/14/2020  4:24 PM     Author: Isela Tovar MD Service: -- Author Type: Physician    Filed: 9/14/2020  4:24 PM Date of Service: 9/14/2020  4:24 PM Status: Signed    : Isela Tovar MD (Physician)       Addendum  created 09/14/20 1624 by Isela Tovar MD    Clinical Note Signed

## 2021-07-03 NOTE — ADDENDUM NOTE
Addendum Note by Nina Amador MD at 9/10/2019 11:00 AM     Author: Nina Amador MD Service: -- Author Type: Physician    Filed: 9/10/2019  1:01 PM Encounter Date: 9/10/2019 Status: Signed    : Nina Amador MD (Physician)    Addended by: NINA AMADOR on: 9/10/2019 01:01 PM        Modules accepted: Orders

## 2021-07-03 NOTE — ADDENDUM NOTE
Addendum Note by Naren Mccabe RN at 8/12/2020 10:48 AM     Author: Naren Mccabe RN Service: -- Author Type: Registered Nurse    Filed: 8/12/2020 11:09 AM Encounter Date: 8/12/2020 Status: Signed    : Naren Mccabe RN (Registered Nurse)    Addended by: NAREN MCCABE on: 8/12/2020 11:09 AM        Modules accepted: Orders

## 2021-07-03 NOTE — ADDENDUM NOTE
Addendum Note by Naren Mccabe RN at 9/16/2020  3:58 PM     Author: Naren Mccabe RN Service: -- Author Type: Registered Nurse    Filed: 9/16/2020  4:21 PM Encounter Date: 9/16/2020 Status: Signed    : Naren Mccabe RN (Registered Nurse)    Addended by: NAREN MCCABE on: 9/16/2020 04:21 PM        Modules accepted: Orders

## 2021-07-04 NOTE — ADDENDUM NOTE
Addendum Note by Nina Amador MD at 4/6/2021  3:45 PM     Author: Nina Amador MD Service: -- Author Type: Physician    Filed: 4/7/2021  6:04 AM Encounter Date: 4/6/2021 Status: Signed    : Nina Amador MD (Physician)    Addended by: NINA AMADOR on: 4/7/2021 06:04 AM        Modules accepted: Orders

## 2021-07-14 PROBLEM — Z34.90 PREGNANT: Status: RESOLVED | Noted: 2017-10-02 | Resolved: 2019-08-07

## 2021-07-20 PROBLEM — R11.0 NAUSEA: Status: ACTIVE | Noted: 2020-11-04

## 2021-07-20 PROBLEM — Z98.84 S/P LAPAROSCOPIC SLEEVE GASTRECTOMY: Status: ACTIVE | Noted: 2020-11-04

## 2021-07-20 PROBLEM — E86.0 MILD DEHYDRATION: Status: ACTIVE | Noted: 2020-11-04

## 2021-07-20 PROBLEM — R13.10 DYSPHAGIA: Status: ACTIVE | Noted: 2020-11-04

## 2021-07-20 PROBLEM — F41.1 GENERALIZED ANXIETY DISORDER: Status: ACTIVE | Noted: 2019-07-16

## 2021-07-24 ENCOUNTER — HEALTH MAINTENANCE LETTER (OUTPATIENT)
Age: 36
End: 2021-07-24

## 2021-08-03 PROBLEM — E66.813 OBESITY, CLASS III, BMI 40-49.9 (MORBID OBESITY) (H): Status: RESOLVED | Noted: 2019-07-19 | Resolved: 2019-08-07

## 2021-08-03 PROBLEM — E66.01 OBESITY, CLASS III, BMI 40-49.9 (MORBID OBESITY) (H): Status: RESOLVED | Noted: 2019-07-19 | Resolved: 2019-08-07

## 2021-09-18 ENCOUNTER — HEALTH MAINTENANCE LETTER (OUTPATIENT)
Age: 36
End: 2021-09-18

## 2022-03-14 NOTE — PROGRESS NOTES
Bariatric Care Clinic Follow Up Visit for Previous Bariatric Surgery   Date of visit: 3/14/2022  Physician: LANIE Post MD, MD  Primary Care is Sly Esposito  Shameka THURMAN Josh Ginasirisha   36 year old  female    Date of Surgery: 9/14/20  Initial Weight: 277#  Initial BMI: 45.39  Today's Weight:   Wt Readings from Last 1 Encounters:   03/15/22 67.6 kg (149 lb)     Body mass index is 24.05 kg/m .       Assessment and Plan   Assessment: Shameka is a 36 year old year old female who is 18 months  s/p SG with Dr. Handy. They have had a durable weight loss of 128 lbs since surgery.  Overall compliance with the [facility] Bariatric Surgery Program has been fairly good. She missed her 12 month follow up visit.      Plan:    1. Postgastrectomy malabsorption  Patient is not taking any vitamins. I stressed the importance of taking them every day and written information was given.  Routine postoperative labs were ordered. She was reminded to slow down, chew foods thoroughly, and to avoid liquids within 30 minutes of solids. Written information was given. She was congratulated on her success thus far. She is interested in getting skin removal surgery. She will document any rashes and we will plan to refer her to plastic surgery when she is 2 years post op.        Total time spent on the date of this encounter doing: chart review, review of test results, patient visit, physical exam, education, counseling, developing plan of care and documenting = 30 minutes.      Bariatric Surgery Review   Interim History/LifeChanges: Patient had a baby in October. She had trouble with nutrition during her pregnancy. She had severe nausea and vomiting. Her highest weight during her pregnancy was 148#. She no longer has nausea and is able to eat even better than before her pregnancy.     Patient Concerns: skin rashes    GERD no    Medication changes: stopped all vitamins    Vitamin Intake:   Multivitamin   none   Vitamin D  none    Calcium  none   Vit. B-12    none     Habits:            Alcohol Intake  rare   NSAID Use  none   Caffeine Use  1 cup per day   Exercise  mommy exercises   CPAP Use:  na   Birth Control  she was prescribed the pill, she is not taking it   Tobacco Use     no                                      LABS: have been ordered      LABS:  Lab Results   Component Value Date    WBC 8.1 05/21/2020    HGB 14.0 05/21/2020    HCT 41.2 05/21/2020    MCV 86 05/21/2020     05/21/2020      No components found for: [id number] No results found for: HGBA1C   Lab Results   Component Value Date    CHOL 198 08/07/2019    No components found for: PTH      No components found for: FERRITIN   Lab Results   Component Value Date    HDL 45 (L) 08/07/2019      No components found for: [id number] No components found for: 44929   No components found for: LDLCALC Lab Results   Component Value Date    TSH 2.09 08/07/2019    No components found for: FOLATE   Lab Results   Component Value Date    TRIG 148 08/07/2019    Lab Results   Component Value Date    ALT 14 05/21/2020    AST 15 05/21/2020    ALKPHOS 28 (L) 05/21/2020    No results found for: TESTOSTERONE     No results found for: CHOLHDL No components found for: 7597   @[employer](vitamin a: 1)@             Patient Profile   Social History     Social History Narrative    Lives with  and son.  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        Past Medical History   Past Medical History:   Diagnosis Date     ADHD      Anxiety      Bacterial vaginosis      History of UTI      Kidney infection      Miscarriage      Nongonococcal urethritis (INDU) due to chlamydia trachomatis 1/05    incommunicado until visit 9/05     Ovarian cyst      Papanicolaou smear of cervix with atypical squamous cells of undetermined significance (ASC-US) 1/2005    HPV positive - did not follow up utnil 9/05     Pyelitis or pyelonephritis in diseases classified elsewhere 2004    hospitalized     Tubal pregnancy   "    Patient Active Problem List   Diagnosis     Other abnormal Papanicolaou smear of cervix and cervical HPV(795.09)     CARDIOVASCULAR SCREENING; LDL GOAL LESS THAN 160     Hypertension goal BP (blood pressure) < 140/80     Primary anovulatory infertility     Anxiety     S/P laparoscopic sleeve gastrectomy     Nausea     Morbid obesity (H)     Mild dehydration     Generalized anxiety disorder     Dysphagia     Attention deficit hyperactivity disorder (ADHD), combined type     Abnormal glandular Papanicolaou smear of cervix     [unfilled]    Past Surgical History  She has a past surgical history that includes appendectomy; Laparoscopy diagnostic (general) (Right, 9/3/2014); Pr Lap, Kerri Restrict Proc, Longitudinal Gastrectomy (N/A, 9/14/2020); Pr Esophagogastroduodenoscopy Transoral Diagnostic (N/A, 11/13/2020); and Pr Esophagogastroduodenoscopy Transoral Diagnostic (N/A, 11/25/2020).     Examination   Ht 1.676 m (5' 6\")   Wt 67.6 kg (149 lb)   BMI 24.05 kg/m      GENERAL: Healthy, alert and no distress  EYES: Eyes grossly normal to inspection.  No discharge or erythema, or obvious scleral/conjunctival abnormalities.  RESP: No audible wheeze, cough, or visible cyanosis.  No visible retractions or increased work of breathing.    SKIN: Visible skin clear. No significant rash, abnormal pigmentation or lesions.  NEURO: Cranial nerves grossly intact.  Mentation and speech appropriate for age.  PSYCH: Mentation appears normal, affect normal/bright, judgement and insight intact, normal speech and appearance well-groomed.         Counseling:   We reviewed the important post op bariatric recommendations:  -eating 3 meals daily  -eating protein first, getting >60gm protein daily  -eating slowly, chewing food well  -avoiding/limiting calorie containing beverages  -drinking water 15-30 minutes before or after meals  -choosing wheat, not white with breads, crackers, pastas, harvey, bagels, tortillas, rice  -limiting " restaurant or cafeteria eating to twice a week or less    We discussed the importance of restorative sleep and stress management in maintaining a healthy weight.  We discussed the National Weight Control Registry healthy weight maintenance strategies and ways to optimize metabolism.  We discussed the importance of physical activity including cardiovascular and strength training in maintaining a healthier weight.  We discussed the importance of life-long vitamin supplementation and life-long  follow-up.    Shameka was reminded that, to avoid marginal ulcers she should avoid tobacco at all, alcohol in excess, caffeine in excess, and NSAIDS (unless indicated for cardioprotection or othewise and opposed by a PPI).    LANIE Post MD  [facility] Bariatric clinic  3/14/2022  9:37 AM    Shameka Maurice is 36 year old  female who presents for a billable video visit today.    How would you like to obtain your AVS? MyChart  If dropped from the video visit, the video invitation should be resent by:   Send text to: [phone]  Will anyone else be joining your video visit? No      Video Start Time: 9:45 am    Are there any specific questions or needs that you would like addressed at your visit today?   18 month post-op. Would like to discuss skin removal.                  Video-Visit Details    Type of service:  Video Visit    Video End Time (time video stopped): 10:00 AM  Originating Location (pt. Location): Home    Distant Location (provider location):  [facility] [place]     Platform used for Video Visit: [employer]          No images are attached to the encounter.

## 2022-03-14 NOTE — PATIENT INSTRUCTIONS
[facility]  Nutritional Guidelines  Gastric Sleeve 18 Months Post Op and Beyond    General Guidelines and Helpful Hints:    Eat 3 meals per day + protein supplement(s). No snacks between meals.  o Do not skip meals.  This can cause overeating at the next meal and will prevent adequate protein and nutritional intake.    Aim for 60-80 grams of protein per day.  o Always eat your protein first. This assists with optimal nutrition and helps you stay full longer.  o Depending on your portion size, you may need to drink approved protein supplement between meals to achieve protein goals. Follow recommendations of your Dietitian.     Eat your protein first, and then follow with fiber.   o It is not necessary to count your fiber, but 15-20 grams per day is recommended.    o Add fiber by including fruits, vegetables, whole grains, and beans.     Portions should remain about 1 cup per meal. Use measuring cups to be accurate.    Continue to use saucer/salad plates, infant/toddler silverware to keep portion sizes small and take small bites.    Eat S-L-O-W-L-Y to make each meal last 20-30 minutes. Always stop eating when satisfied.    Continue to use caution with foods containing skins, peels or membranes. Chew well!    Aim for 64 oz. of calorie-free fluids daily.  o Continue to avoid caffeine and carbonation. If you choose to drink alcohol, do so in moderation.   o Remember to avoid drinking during meals, 15-30 minutes before and 30 minutes after.    Exercise is alvarez for continued weight loss and weight maintenance. Aim for 30-60 minutes of physical activity most days of the week. Include cardiovascular and strength training.    If having trouble tolerating meat, try using a crock-pot, tinfoil tent, steamer or other moist cooking method to create tender meats. Add broth or low-fat gravy to help meat stay moist.     Avoid high sugar and high fat foods to prevent high calorie intakes and a reduced rate of weight loss, or  weight regain.  o Check nutrition labels for less than 10 grams of sugar and less than 10 grams of fat per serving.    Continue Taking Vitamins/Minerals:  o 9238-7650 mcg of Sublingual B-12 daily  o 1 Multivitamin with Iron twice daily (chewable or swallow tabs)  o 500-600 mg Calcium Citrate twice daily (chewable or swallow tabs)  o 5000 IU Vitamin D3 daily  o One iron tablet per day for menstruating females  o You may need an additional B complex or thiamine tablet    Sample Grocery List    Protein:    Fat free Greek or light yogurt (less than 10 grams sugar)    Fat free or low-fat cottage cheese    String cheese or reduced fat cheese slices    Tuna, salmon, crab, egg, or chicken salad made with light or fat free mayonnaise    Egg or Egg Substitute    Lean/extra lean turkey, beef, bison, venison (ground, sirloin, round, flank)    Pork loin or tenderloin (grilled, baked, broiled)    Fish such as salmon, tuna, trout, tilapia, etc. (grilled, baked, broiled)    Tender cuts of lean (skinless) turkey or chicken    Lean deli meats: turkey, lean ham, chicken, lean roast beef    Beans such as kidney, garbanzo, black, linares, or low-fat/fat free refried beans    Peanut butter (natural preferred). Limit to 1 Tbsp. per day.    Low-fat meatloaf (made with lean ground beef or turkey)    Sloppy Joes made with low-sugar ketchup and lean ground beef or turkey    Soy or vegetable protein (i.e. vegan crumbles, soy/veggie burger, tofu)    Hummus    Vegetables:    Fresh: cooked or raw (as tolerated)    Frozen vegetables    Canned vegetables (low sodium or no salt added, rinse before cooking/eating)    (Ok to have skins/peels/membranes/seeds - just chew well)    Fruits:    Fresh fruit    Frozen fruit (no sugar added)    Canned fruit (packed in its own juice, NOT syrup)    (Ok to have skins/peels/membranes/seeds - just chew well)    Starch:    Unsweetened whole-grain hot cereal (or high fiber cold cereal, dry)    Toasted whole wheat bread  or Sylva Thins    Whole grain crackers    Baked /boiled/mashed potato/sweet potato    Cooked whole grain pasta, brown rice, or other cooked whole grains    Starchy vegetables: corn, peas, winter squash    Protein Supplement:     Ready to drink protein shake with:  o 15-30 grams protein per serving  o Less than 10 grams total carbohydrate per serving     Protein powder mixed with:  o  Skim or 1% milk  o Low fat or fat free Lactaid milk, plain or no sugar added soymilk  o Water     Fats: (use in moderation)    1 teaspoon of soft tub margarine    1 teaspoon olive oil, canola oil, or peanut oil    1 tablespoon of low-fat ruiz or salad dressing     Sample Menu for 18+ months after Gastric Sleeve    You do NOT need to eat/drink the full portion sizes listed below  Always stop when you are satisfied    Breakfast   cup 1% cottage cheese     cup mixed berries   Lunch 2 oz lean roast beef on   Sylva Thin with 1 tsp. light ruiz    small tomato, chopped, mixed with 1 tsp. light vinaigrette dressing   Supplement Approved protein supplement (if needed between meals)   Dinner 2 oz grilled salmon    cup salad greens with 1 tsp. light salad dressing and 1 tsp. ground flax seed    cup quinoa or brown rice     Breakfast   cup egg substitute with   cup sautéed chopped vegetables  2 light [place] Krisp crackers   Lunch Tuna Melt:   cup tuna mixed with 1 tsp. light ruiz over   Sylva Thin. Top with 2-3 slices cucumber and 1 oz slice of low fat cheese   Supplement 1 cup skim milk (if needed between meals)   Dinner 3 oz  grilled, broiled, or baked seasoned skinless chicken breast    cup asparagus     Breakfast   cup plain oatmeal made with skim or 1% milk with 1 Tbsp. flavored/unflavored protein powder added  1 mozzarella string cheese   Lunch 2 oz deli turkey breast  1/3 cup salad with 1 tsp. light salad dressing, 1/8 of a whole avocado and 1 Tbsp. sunflower seeds   Dinner 3 oz. pork loin made in a crock pot, seasoned with a spice  rub    cup cooked carrots   Supplement Approved protein supplement (if needed between meals)     Breakfast 1 cup breakfast casserole made with egg substitute, turkey sausage,  and steamed, chopped bell peppers   Supplement  1 cup light Greek yogurt (if needed between meals)   Lunch 2 oz. teriyaki turkey    cup mashed sweet potato with 1-2 spritzes of spray butter (like [place])    cup fresh pineapple   Dinner 3 oz low fat meatloaf    cup roasted garlic zucchini     Breakfast   cup leftover breakfast casserole    cup no sugar added applesauce with 1 Tbsp. unflavored protein powder and a sprinkle of cinnamon    Lunch 3 oz shrimp with 1-2 Tbsp. low-sugar cocktail sauce for dipping    c. whole wheat pasta drizzled with   tsp. olive oil   Supplement 1 cup skim/1% milk with scoop of protein powder (if needed between meals)   Dinner Grilled, seasoned kebob with 2 oz lean beef and   cup vegetables     Breakfast Breakfast pizza:   [place] Thin spread with 1 Tbsp. low sugar spaghetti sauce,   cup shredded low fat cheese, melted and 1 slice of [place] perdomo     cup fresh fruit mixed with chopped almonds   Lunch   cup black bean soup  4-5 whole grain crackers   Dinner 3 oz  tilapia with lemon pepper seasoning    cup stewed tomatoes   Supplement 1 string cheese (if needed between meals)     Breakfast 2 hard boiled eggs (discard 1 egg yolk)    whole wheat English Muffin with 1 tsp. low sugar jelly   Lunch   cup leftover black bean soup topped with 1-2 Tbsp. low fat cheese  2-3 light [place] Krisp crackers   Supplement Approved protein supplement (if needed between meals)   Dinner 3 oz sirloin steak    cup steamed broccoli

## 2022-03-15 ENCOUNTER — VIRTUAL VISIT (OUTPATIENT)
Dept: SURGERY | Facility: CLINIC | Age: 37
End: 2022-03-15
Payer: COMMERCIAL

## 2022-03-15 VITALS — HEIGHT: 66 IN | WEIGHT: 149 LBS | BODY MASS INDEX: 23.95 KG/M2

## 2022-03-15 DIAGNOSIS — K91.2 POSTGASTRECTOMY MALABSORPTION: Primary | ICD-10-CM

## 2022-03-15 DIAGNOSIS — Z90.3 POSTGASTRECTOMY MALABSORPTION: Primary | ICD-10-CM

## 2022-03-15 PROCEDURE — 99214 OFFICE O/P EST MOD 30 MIN: CPT | Mod: 95 | Performed by: FAMILY MEDICINE

## 2022-03-15 RX ORDER — DEXTROAMPHETAMINE SACCHARATE, AMPHETAMINE ASPARTATE, DEXTROAMPHETAMINE SULFATE AND AMPHETAMINE SULFATE 2.5; 2.5; 2.5; 2.5 MG/1; MG/1; MG/1; MG/1
1 TABLET ORAL
COMMUNITY
Start: 2022-03-02

## 2022-03-15 RX ORDER — DEXTROAMPHETAMINE SULFATE, DEXTROAMPHETAMINE SACCHARATE, AMPHETAMINE SULFATE AND AMPHETAMINE ASPARTATE 5; 5; 5; 5 MG/1; MG/1; MG/1; MG/1
1 CAPSULE, EXTENDED RELEASE ORAL EVERY MORNING
COMMUNITY
Start: 2022-03-01

## 2022-03-15 RX ORDER — CYCLOBENZAPRINE HCL 10 MG
1 TABLET ORAL PRN
COMMUNITY
Start: 2021-12-29 | End: 2022-09-19

## 2022-03-15 RX ORDER — LORAZEPAM 0.5 MG/1
1 TABLET ORAL DAILY
COMMUNITY
Start: 2022-03-02 | End: 2022-09-19

## 2022-03-15 NOTE — LETTER
3/15/2022         RE: Shameka Maurice  [address] [place] [address]        Dear Colleague,    Thank you for referring your patient, Shameka aMurice, to the [facility] SURGERY CL[facility]IC AND BARIATRICS [facility]. Please see a copy of my visit note below.    Bariatric Care Clinic Follow Up Visit for Previous Bariatric Surgery   Date of visit: 3/14/2022  Physician: LANIE Post MD, MD  Primary Care is Sly Esposito  Shameka Maurice   36 year old  female    Date of Surgery: 9/14/20  Initial Weight: 277#  Initial BMI: 45.39  Today's Weight:   Wt Readings from Last 1 Encounters:   03/15/22 67.6 kg (149 lb)     Body mass index is 24.05 kg/m .       Assessment and Plan   Assessment: Shameka is a 36 year old year old female who is 18 months  s/p SG with Dr. Handy. They have had a durable weight loss of 128 lbs since surgery.  Overall compliance with the [facility] Bariatric Surgery Program has been fairly good. She missed her 12 month follow up visit.      Plan:    1. Postgastrectomy malabsorption  Patient is not taking any vitamins. I stressed the importance of taking them every day and written information was given.  Routine postoperative labs were ordered. She was reminded to slow down, chew foods thoroughly, and to avoid liquids within 30 minutes of solids. Written information was given. She was congratulated on her success thus far. She is interested in getting skin removal surgery. She will document any rashes and we will plan to refer her to plastic surgery when she is 2 years post op.        Total time spent on the date of this encounter doing: chart review, review of test results, patient visit, physical exam, education, counseling, developing plan of care and documenting = 30 minutes.      Bariatric Surgery Review   Interim History/LifeChanges: Patient had a baby in October. She had trouble with nutrition during her pregnancy. She had severe nausea and  vomiting. Her highest weight during her pregnancy was 148#. She no longer has nausea and is able to eat even better than before her pregnancy.     Patient Concerns: skin rashes    GERD no    Medication changes: stopped all vitamins    Vitamin Intake:   Multivitamin   none   Vitamin D  none   Calcium  none   Vit. B-12    none     Habits:            Alcohol Intake  rare   NSAID Use  none   Caffeine Use  1 cup per day   Exercise  mommy exercises   CPAP Use:  na   Birth Control  she was prescribed the pill, she is not taking it   Tobacco Use     no                                      LABS: have been ordered      LABS:  Lab Results   Component Value Date    WBC 8.1 05/21/2020    HGB 14.0 05/21/2020    HCT 41.2 05/21/2020    MCV 86 05/21/2020     05/21/2020      No components found for: [id number] No results found for: HGBA1C   Lab Results   Component Value Date    CHOL 198 08/07/2019    No components found for: PTH      No components found for: FERRITIN   Lab Results   Component Value Date    HDL 45 (L) 08/07/2019      No components found for: [id number] No components found for: 77724   No components found for: LDLCALC Lab Results   Component Value Date    TSH 2.09 08/07/2019    No components found for: FOLATE   Lab Results   Component Value Date    TRIG 148 08/07/2019    Lab Results   Component Value Date    ALT 14 05/21/2020    AST 15 05/21/2020    ALKPHOS 28 (L) 05/21/2020    No results found for: TESTOSTERONE     No results found for: CHOLHDL No components found for: 7597   @resusfast(vitamin a: 1)@             Patient Profile   Social History     Social History Narrative    Lives with  and son.  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        Past Medical History   Past Medical History:   Diagnosis Date     ADHD      Anxiety      Bacterial vaginosis      History of UTI      Kidney infection      Miscarriage      Nongonococcal urethritis (INDU) due to chlamydia trachomatis 1/05    incommunicado  "until visit 9/05     Ovarian cyst      Papanicolaou smear of cervix with atypical squamous cells of undetermined significance (ASC-US) 1/2005    HPV positive - did not follow up utnil 9/05     Pyelitis or pyelonephritis in diseases classified elsewhere 2004    hospitalized     Tubal pregnancy      Patient Active Problem List   Diagnosis     Other abnormal Papanicolaou smear of cervix and cervical HPV(795.09)     CARDIOVASCULAR SCREENING; LDL GOAL LESS THAN 160     Hypertension goal BP (blood pressure) < 140/80     Primary anovulatory infertility     Anxiety     S/P laparoscopic sleeve gastrectomy     Nausea     Morbid obesity (H)     Mild dehydration     Generalized anxiety disorder     Dysphagia     Attention deficit hyperactivity disorder (ADHD), combined type     Abnormal glandular Papanicolaou smear of cervix     [unfilled]    Past Surgical History  She has a past surgical history that includes appendectomy; Laparoscopy diagnostic (general) (Right, 9/3/2014); Pr Lap, Kerri Restrict Proc, Longitudinal Gastrectomy (N/A, 9/14/2020); Pr Esophagogastroduodenoscopy Transoral Diagnostic (N/A, 11/13/2020); and Pr Esophagogastroduodenoscopy Transoral Diagnostic (N/A, 11/25/2020).     Examination   Ht 1.676 m (5' 6\")   Wt 67.6 kg (149 lb)   BMI 24.05 kg/m      GENERAL: Healthy, alert and no distress  EYES: Eyes grossly normal to inspection.  No discharge or erythema, or obvious scleral/conjunctival abnormalities.  RESP: No audible wheeze, cough, or visible cyanosis.  No visible retractions or increased work of breathing.    SKIN: Visible skin clear. No significant rash, abnormal pigmentation or lesions.  NEURO: Cranial nerves grossly intact.  Mentation and speech appropriate for age.  PSYCH: Mentation appears normal, affect normal/bright, judgement and insight intact, normal speech and appearance well-groomed.         Counseling:   We reviewed the important post op bariatric recommendations:  -eating 3 meals " daily  -eating protein first, getting >60gm protein daily  -eating slowly, chewing food well  -avoiding/limiting calorie containing beverages  -drinking water 15-30 minutes before or after meals  -choosing wheat, not white with breads, crackers, pastas, harvey, bagels, tortillas, rice  -limiting restaurant or cafeteria eating to twice a week or less    We discussed the importance of restorative sleep and stress management in maintaining a healthy weight.  We discussed the National Weight Control Registry healthy weight maintenance strategies and ways to optimize metabolism.  We discussed the importance of physical activity including cardiovascular and strength training in maintaining a healthier weight.  We discussed the importance of life-long vitamin supplementation and life-long  follow-up.    Shameka was reminded that, to avoid marginal ulcers she should avoid tobacco at all, alcohol in excess, caffeine in excess, and NSAIDS (unless indicated for cardioprotection or othewise and opposed by a PPI).    LANIE Post MD  [facility] Bariatric clinic  3/14/2022  9:37 AM    Shameka Maurice is 36 year old  female who presents for a billable video visit today.    How would you like to obtain your AVS? MyChart  If dropped from the video visit, the video invitation should be resent by:   Send text to: [phone]  Will anyone else be joining your video visit? No      Video Start Time: 9:45 am    Are there any specific questions or needs that you would like addressed at your visit today?   18 month post-op. Would like to discuss skin removal.                  Video-Visit Details    Type of service:  Video Visit    Video End Time (time video stopped): 10:00 AM  Originating Location (pt. Location): Home    Distant Location (provider location):  [facility] SURGERY CLINIC AND BARIATRICS CARE [place]     Platform used for Video Visit: [employer]          No images are attached to the  encounter.        Again, thank you for allowing me to participate in the care of your patient.        Sincerely,        LANIE Post MD

## 2022-03-22 ENCOUNTER — VIRTUAL VISIT (OUTPATIENT)
Dept: SURGERY | Facility: CLINIC | Age: 37
End: 2022-03-22
Payer: COMMERCIAL

## 2022-03-22 VITALS — HEIGHT: 66 IN | WEIGHT: 149 LBS | BODY MASS INDEX: 23.95 KG/M2

## 2022-03-22 DIAGNOSIS — Z71.3 NUTRITIONAL COUNSELING: ICD-10-CM

## 2022-03-22 DIAGNOSIS — Z98.84 BARIATRIC SURGERY STATUS: Primary | ICD-10-CM

## 2022-03-22 PROCEDURE — 97803 MED NUTRITION INDIV SUBSEQ: CPT | Mod: 95 | Performed by: DIETITIAN, REGISTERED

## 2022-03-22 NOTE — LETTER
3/22/2022         RE: Shameka Maurice  [address]  [facility] [address]        Dear Colleague,    Thank you for referring your patient, Shameka Maurice, to the [facility] SURGERY CLINIC AND BARIATRICS CARE [place]. Please see a copy of my visit note below.    Shameka Maurice is a 36 year old who is being evaluated via a billable telephone visit.      What phone number would you like to be contacted at? [phone]  How would you like to obtain your AVS? MyChart      Post-op Surgical Weight Loss Diet Evaluation     Assessment:  Pt presents for 18 months post-op RD visit, s/p LSG on 9-14-20 with Dr. Handy. Today we reviewed current eating habits and level of physical activity, and instructed on the changes that are required for successful bariatric outcomes.    Patient Progress: feeling really good- has a bit of fear of how much she is able to eat and what she can tolerate  +had a baby boy Oct 29, 2021    Initial weight: 227 lb  Current weight: 149lb  Weight change: down 78lb    Body mass index is 24.05 kg/m .    Patient Active Problem List   Diagnosis     Other abnormal Papanicolaou smear of cervix and cervical HPV(795.09)     CARDIOVASCULAR SCREENING; LDL GOAL LESS THAN 160     Hypertension goal BP (blood pressure) < 140/80     Primary anovulatory infertility     Anxiety     S/P laparoscopic sleeve gastrectomy     Nausea     Morbid obesity (H)     Mild dehydration     Generalized anxiety disorder     Dysphagia     Attention deficit hyperactivity disorder (ADHD), combined type     Abnormal glandular Papanicolaou smear of cervix         Vitamins   Multi Vit with Iron: yes  Calcium Citrate: yes  B12: yes  D3: yes      Nausea: no  Vomiting: no  Diarrhea: no  Constipation: no  Hair loss:yes    Diet Recall/Time:   Breakfast: coffee, cream cheese French  Lunch: skip  Dinner: banana and couple bites of chicken nuggets and french fries      Fluid Intake  Water: 64oz  Caffeine: coffee  "in the morning    Exercise: some home exercises- pilates this weekend, walking outside      PES statement:      (NC-1.4) Altered GI Function related to Alteration in gastrointestinal tract structure and/or function/ Decreased functional length of the GI tract as evidenced by Weight loss of 34.36% initial body weight;  sleeve gastrectomy  (NI-5.7.1) Inadequate protein intake related to Gastric bypass causing increased nutrient needs due to malabsorption/ Decreased ability to consume sufficient protein as evidenced by Edema, poor musculature, dull skin, thin and fragile hair; and Estimated intake of protein insufficient to meet requirements    Intervention    Discussion  1. Discussed 18 month Post-Op Nutritional Guidelines for LSG  2. Recommended to consume 15-20gm protein at 3 meals daily, along with protein supplement/\"planned protein containing snack\" of 15-30gm protein, to reach goal of 60-80 gm protein daily.  3. Educated on post-op vitamin regimen: Multi Vit + iron 2x/day, calcium citrate 400-600 mg 2x/day, 8289-5035 mcg of Sublingual B-12 daily, and 5000 IU Vitamin D3 daily (MVI and calcium can be taken at the same time BID)  4. Reviewed lean protein sources  5. Bariatric Plate Method-  including lean/low fat protein at each meal, including a vegetable/fruit, and limiting carbohydrate intake to less than 25% of plate volume.     Instructions  1. Include 15-20gm protein at each meal, along with protein supplement/\"planned protein containing snack\" of 15-30gm protein, to reach goal of 60-80 gm protein daily.  2. Increase fluid intake to 64oz daily: choose plain or calorie/carbonation-free beverages.  3. Incorporate daily structured activity, 30-60 minutes most days of the week  4. Recommended pt to start taking: Multi Vit + iron 2x/day, calcium citrate 400-600 mg 2x/day, 7452-9731 mcg of Sublingual B-12 daily, and 5000 IU Vitamin D3 daily. (MVI and calcium can be taken at the same time)  5. Read food labels more " consistently: keeping total fat grams <10, total sugar grams <10, fiber >3gm per serving.  6. Increase vegetable/fruit intake, by having a vegetable or fruit with each meal daily.  7. Practice plate method: 1/2 plate lean/low fat protein source, vegetable/fruit, <25% of plate complex carbohydrates.  8. Separate fluids 30 minutes before/after meal times.  9. Practice eating off of smaller plates/bowls, chewing to applesauce consistency, taking 20-30 minutes to eat in a calm/relaxed environment without distractions of tv/email/cell phone.    Handouts provided:  18 month Post-Op Nutritional Guidelines for LSG    Assessment/Plan:    Pt to follow up for 2 year post-op visit with bariatrician       Phone call duration: 20 minutes    CAROLYN GOMEZ RD          Again, thank you for allowing me to participate in the care of your patient.        Sincerely,        CAROLYN GOMEZ RD

## 2022-03-22 NOTE — PROGRESS NOTES
Shameka Maurice is a 36 year old who is being evaluated via a billable telephone visit.      What phone number would you like to be contacted at? [phone]  How would you like to obtain your AVS? Shu      Post-op Surgical Weight Loss Diet Evaluation     Assessment:  Pt presents for 18 months post-op RD visit, s/p LSG on 9-14-20 with Dr. Handy. Today we reviewed current eating habits and level of physical activity, and instructed on the changes that are required for successful bariatric outcomes.    Patient Progress: feeling really good- has a bit of fear of how much she is able to eat and what she can tolerate  +had a baby boy Oct 29, 2021    Initial weight: 227 lb  Current weight: 149lb  Weight change: down 78lb    Body mass index is 24.05 kg/m .    Patient Active Problem List   Diagnosis     Other abnormal Papanicolaou smear of cervix and cervical HPV(795.09)     CARDIOVASCULAR SCREENING; LDL GOAL LESS THAN 160     Hypertension goal BP (blood pressure) < 140/80     Primary anovulatory infertility     Anxiety     S/P laparoscopic sleeve gastrectomy     Nausea     Morbid obesity (H)     Mild dehydration     Generalized anxiety disorder     Dysphagia     Attention deficit hyperactivity disorder (ADHD), combined type     Abnormal glandular Papanicolaou smear of cervix         Vitamins   Multi Vit with Iron: yes  Calcium Citrate: yes  B12: yes  D3: yes      Nausea: no  Vomiting: no  Diarrhea: no  Constipation: no  Hair loss:yes    Diet Recall/Time:   Breakfast: coffee, cream cheese Slovak  Lunch: skip  Dinner: banana and couple bites of chicken nuggets and french fries      Fluid Intake  Water: 64oz  Caffeine: coffee in the morning    Exercise: some home exercises- pilates this weekend, walking outside      PES statement:      (NC-1.4) Altered GI Function related to Alteration in gastrointestinal tract structure and/or function/ Decreased functional length of the GI tract as evidenced by Weight loss  "of 34.36% initial body weight;  sleeve gastrectomy  (NI-5.7.1) Inadequate protein intake related to Gastric bypass causing increased nutrient needs due to malabsorption/ Decreased ability to consume sufficient protein as evidenced by Edema, poor musculature, dull skin, thin and fragile hair; and Estimated intake of protein insufficient to meet requirements    Intervention    Discussion  1. Discussed 18 month Post-Op Nutritional Guidelines for LSG  2. Recommended to consume 15-20gm protein at 3 meals daily, along with protein supplement/\"planned protein containing snack\" of 15-30gm protein, to reach goal of 60-80 gm protein daily.  3. Educated on post-op vitamin regimen: Multi Vit + iron 2x/day, calcium citrate 400-600 mg 2x/day, 6151-8855 mcg of Sublingual B-12 daily, and 5000 IU Vitamin D3 daily (MVI and calcium can be taken at the same time BID)  4. Reviewed lean protein sources  5. Bariatric Plate Method-  including lean/low fat protein at each meal, including a vegetable/fruit, and limiting carbohydrate intake to less than 25% of plate volume.     Instructions  1. Include 15-20gm protein at each meal, along with protein supplement/\"planned protein containing snack\" of 15-30gm protein, to reach goal of 60-80 gm protein daily.  2. Increase fluid intake to 64oz daily: choose plain or calorie/carbonation-free beverages.  3. Incorporate daily structured activity, 30-60 minutes most days of the week  4. Recommended pt to start taking: Multi Vit + iron 2x/day, calcium citrate 400-600 mg 2x/day, 5506-7612 mcg of Sublingual B-12 daily, and 5000 IU Vitamin D3 daily. (MVI and calcium can be taken at the same time)  5. Read food labels more consistently: keeping total fat grams <10, total sugar grams <10, fiber >3gm per serving.  6. Increase vegetable/fruit intake, by having a vegetable or fruit with each meal daily.  7. Practice plate method: 1/2 plate lean/low fat protein source, vegetable/fruit, <25% of plate complex " carbohydrates.  8. Separate fluids 30 minutes before/after meal times.  9. Practice eating off of smaller plates/bowls, chewing to applesauce consistency, taking 20-30 minutes to eat in a calm/relaxed environment without distractions of tv/email/cell phone.    Handouts provided:  18 month Post-Op Nutritional Guidelines for LSG    Assessment/Plan:    Pt to follow up for 2 year post-op visit with bariatrician       Phone call duration: 20 minutes    CAROLYN GOMEZ RD

## 2022-08-14 ENCOUNTER — HEALTH MAINTENANCE LETTER (OUTPATIENT)
Age: 37
End: 2022-08-14

## 2022-09-19 ENCOUNTER — VIRTUAL VISIT (OUTPATIENT)
Dept: SURGERY | Facility: CLINIC | Age: 37
End: 2022-09-19
Payer: COMMERCIAL

## 2022-09-19 VITALS — WEIGHT: 148 LBS | BODY MASS INDEX: 23.78 KG/M2 | HEIGHT: 66 IN

## 2022-09-19 DIAGNOSIS — K91.2 POSTGASTRECTOMY MALABSORPTION: Primary | ICD-10-CM

## 2022-09-19 DIAGNOSIS — Z90.3 POSTGASTRECTOMY MALABSORPTION: Primary | ICD-10-CM

## 2022-09-19 PROCEDURE — 99214 OFFICE O/P EST MOD 30 MIN: CPT | Mod: 95 | Performed by: FAMILY MEDICINE

## 2022-09-19 RX ORDER — CITALOPRAM HYDROBROMIDE 20 MG/1
20 TABLET ORAL DAILY
COMMUNITY
Start: 2021-12-30

## 2022-09-19 RX ORDER — FLUCONAZOLE 150 MG/1
150 TABLET ORAL
COMMUNITY
Start: 2022-04-28

## 2022-09-19 RX ORDER — CYCLOBENZAPRINE HCL 10 MG
10 TABLET ORAL DAILY PRN
COMMUNITY
Start: 2022-08-17

## 2022-09-19 RX ORDER — METRONIDAZOLE 7.5 MG/G
GEL VAGINAL WEEKLY
COMMUNITY
Start: 2022-04-28

## 2022-09-19 RX ORDER — NORELGESTROMIN AND ETHINYL ESTRADIOL 150; 35 UG/D; UG/D
1 PATCH TRANSDERMAL WEEKLY
COMMUNITY
Start: 2022-04-28

## 2022-09-19 RX ORDER — LORAZEPAM 0.5 MG/1
0.5 TABLET ORAL
COMMUNITY
Start: 2022-08-17

## 2022-09-19 RX ORDER — CHOLECALCIFEROL (VITAMIN D3) 50 MCG
1 TABLET ORAL DAILY
COMMUNITY

## 2022-09-19 NOTE — PROGRESS NOTES
Bariatric Care Clinic Follow Up Visit for Previous Bariatric Surgery   Date of visit: 9/19/2022  Physician: LANIE Post MD, MD  Primary Care is Sly EspositoKain THURMAN Josh Ginasirisha   36 year old  female    Date of Surgery: 9/14/20  Initial Weight: 277#  Initial BMI: 45.39  Today's Weight:   Wt Readings from Last 1 Encounters:   09/19/22 67.1 kg (148 lb)     Body mass index is 23.89 kg/m .       Assessment and Plan   Assessment: Shameka is a 36 year old year old female who is 2 years s/p LSG with Dr. Handy. . They have had a durable weight loss of 129 lbs since surgery.  Overall compliance with the [facility] Bariatric Surgery Program has been good.      Plan:    1. Postgastrectomy malabsorption  Patient is unsure which vitamins she is taking. Routine vitamin supplementation was reviewed and written information was given. She will have her labs drawn. She is interested in having a panniculectomy as she has been having rashes under her abdominal pannus.         Total time spent on the date of this encounter doing: chart review, review of test results, patient visit, physical exam, education, counseling, developing plan of care and documenting = 30 minutes.      Bariatric Surgery Review   Interim History/LifeChanges: Patient had a baby boy on Oct 29, 2021.  She is feeling good and is happy with her weight loss thus far.     Patient Concerns: rashes under belly    GERD none    Medication changes: no recent    Vitamin Intake:   Multivitamin   1 gummy?   Vitamin D  unsure   Calcium  1 gummy?   Vit. B-12    oral     Habits:            Alcohol Intake  rare   NSAID Use  none   Caffeine Use  morning coffee   Exercise  goes on walks with the baby   CPAP Use:  na   Birth Control  patch   Tobacco Use     no                                      LABS: ordered      LABS:  Lab Results   Component Value Date    WBC 8.1 05/21/2020    HGB 14.0 05/21/2020    HCT 41.2 05/21/2020    MCV 86 05/21/2020      05/21/2020      No components found for: [id number] No results found for: HGBA1C   Lab Results   Component Value Date    CHOL 198 08/07/2019    No components found for: PTH      No components found for: FERRITIN   Lab Results   Component Value Date    HDL 45 (L) 08/07/2019      No components found for: [id number] No components found for: 61347   No components found for: LDLCALC Lab Results   Component Value Date    TSH 2.09 08/07/2019    No components found for: FOLATE   Lab Results   Component Value Date    TRIG 148 08/07/2019    Lab Results   Component Value Date    ALT 14 05/21/2020    AST 15 05/21/2020    ALKPHOS 28 (L) 05/21/2020    No results found for: TESTOSTERONE     No results found for: CHOLHDL No components found for: 7597   [email or web address](vitamin a: 1)@             Patient Profile   Social History     Social History Narrative    Lives with  and son.  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        Past Medical History   Past Medical History:   Diagnosis Date     ADHD      Anxiety      Bacterial vaginosis      History of UTI      Kidney infection      Miscarriage      Nongonococcal urethritis (INDU) due to chlamydia trachomatis 1/05    incommunicado until visit 9/05     Ovarian cyst      Papanicolaou smear of cervix with atypical squamous cells of undetermined significance (ASC-US) 1/2005    HPV positive - did not follow up utnil 9/05     Pyelitis or pyelonephritis in diseases classified elsewhere 2004    hospitalized     Tubal pregnancy      Patient Active Problem List   Diagnosis     Other abnormal Papanicolaou smear of cervix and cervical HPV(795.09)     CARDIOVASCULAR SCREENING; LDL GOAL LESS THAN 160     Hypertension goal BP (blood pressure) < 140/80     Primary anovulatory infertility     Anxiety     S/P laparoscopic sleeve gastrectomy     Nausea     Morbid obesity (H)     Mild dehydration     Generalized anxiety disorder     Dysphagia     Attention deficit hyperactivity disorder (ADHD),  "combined type     Abnormal glandular Papanicolaou smear of cervix     [unfilled]    Past Surgical History  She has a past surgical history that includes appendectomy; Laparoscopy diagnostic (general) (Right, 9/3/2014); Pr Lap, Kerri Restrict Proc, Longitudinal Gastrectomy (N/A, 9/14/2020); Pr Esophagogastroduodenoscopy Transoral Diagnostic (N/A, 11/13/2020); and Pr Esophagogastroduodenoscopy Transoral Diagnostic (N/A, 11/25/2020).     Examination   Ht 1.676 m (5' 6\")   Wt 67.1 kg (148 lb)   BMI 23.89 kg/m      GENERAL: Healthy, alert and no distress  EYES: Eyes grossly normal to inspection.  No discharge or erythema, or obvious scleral/conjunctival abnormalities.  RESP: No audible wheeze, cough, or visible cyanosis.  No visible retractions or increased work of breathing.    SKIN: Visible skin clear. No significant rash, abnormal pigmentation or lesions.  NEURO: Cranial nerves grossly intact.  Mentation and speech appropriate for age.  PSYCH: Mentation appears normal, affect normal/bright, judgement and insight intact, normal speech and appearance well-groomed.         Counseling:   We reviewed the important post op bariatric recommendations:  -eating 3 meals daily  -eating protein first, getting >60gm protein daily  -eating slowly, chewing food well  -avoiding/limiting calorie containing beverages  -drinking water 15-30 minutes before or after meals  -choosing wheat, not white with breads, crackers, pastas, harvey, bagels, tortillas, rice  -limiting restaurant or cafeteria eating to twice a week or less    We discussed the importance of restorative sleep and stress management in maintaining a healthy weight.  We discussed the National Weight Control Registry healthy weight maintenance strategies and ways to optimize metabolism.  We discussed the importance of physical activity including cardiovascular and strength training in maintaining a healthier weight.  We discussed the importance of life-long vitamin " supplementation and life-long  follow-up.    Shameka was reminded that, to avoid marginal ulcers she should avoid tobacco at all, alcohol in excess, caffeine in excess, and NSAIDS (unless indicated for cardioprotection or othewise and opposed by a PPI).    LANIE Post MD  [facility]ariat[facility]  9/19/2022  7:05 AM            No images are attached to the encounter.

## 2022-09-19 NOTE — LETTER
9/19/2022         RE: Shameka Maurice  [address] [place] [address]        Dear Colleague,    Thank you for referring your patient, Shameka Maurice, to the [facility]. Please see a copy of my visit note below.    Bariatric Care Clinic Follow Up Visit for Previous Bariatric Surgery   Date of visit: 9/19/2022  Physician: LANIE Post MD, MD  Primary Care is Sly Esposito  Shameka Maurice   36 year old  female    Date of Surgery: 9/14/20  Initial Weight: 277#  Initial BMI: 45.39  Today's Weight:   Wt Readings from Last 1 Encounters:   09/19/22 67.1 kg (148 lb)     Body mass index is 23.89 kg/m .       Assessment and Plan   Assessment: Shameka is a 36 year old year old female who is 2 years s/p LSG with Dr. Handy. . They have had a durable weight loss of 129 lbs since surgery.  Overall compliance with the [facility] Bariatric Surgery Program has been good.      Plan:    1. Postgastrectomy malabsorption  Patient is unsure which vitamins she is taking. Routine vitamin supplementation was reviewed and written information was given. She will have her labs drawn. She is interested in having a panniculectomy as she has been having rashes under her abdominal pannus.         Total time spent on the date of this encounter doing: chart review, review of test results, patient visit, physical exam, education, counseling, developing plan of care and documenting = 30 minutes.      Bariatric Surgery Review   Interim History/LifeChanges: Patient had a baby boy on Oct 29, 2021.  She is feeling good and is happy with her weight loss thus far.     Patient Concerns: rashes under belly    GERD none    Medication changes: no recent    Vitamin Intake:   Multivitamin   1 gummy?   Vitamin D  unsure   Calcium  1 gummy?   Vit. B-12    oral     Habits:            Alcohol Intake  rare   NSAID Use  none   Caffeine Use  morning coffee   Exercise  goes  on walks with the baby   CPAP Use:  na   Birth Control  patch   Tobacco Use     no                                      LABS: ordered      LABS:  Lab Results   Component Value Date    WBC 8.1 05/21/2020    HGB 14.0 05/21/2020    HCT 41.2 05/21/2020    MCV 86 05/21/2020     05/21/2020      No components found for: [id number] No results found for: HGBA1C   Lab Results   Component Value Date    CHOL 198 08/07/2019    No components found for: PTH      No components found for: FERRITIN   Lab Results   Component Value Date    HDL 45 (L) 08/07/2019      No components found for: [id number] No components found for: 35465   No components found for: LDLCALC Lab Results   Component Value Date    TSH 2.09 08/07/2019    No components found for: FOLATE   Lab Results   Component Value Date    TRIG 148 08/07/2019    Lab Results   Component Value Date    ALT 14 05/21/2020    AST 15 05/21/2020    ALKPHOS 28 (L) 05/21/2020    No results found for: TESTOSTERONE     No results found for: CHOLHDL No components found for: 7597   @[employer](vitamin a: 1)@             Patient Profile   Social History     Social History Narrative    Lives with  and son.  Works as manager at [employer].        Past Medical History   Past Medical History:   Diagnosis Date     ADHD      Anxiety      Bacterial vaginosis      History of UTI      Kidney infection      Miscarriage      Nongonococcal urethritis (INDU) due to chlamydia trachomatis 1/05    incommunicado until visit 9/05     Ovarian cyst      Papanicolaou smear of cervix with atypical squamous cells of undetermined significance (ASC-US) 1/2005    HPV positive - did not follow up utnil 9/05     Pyelitis or pyelonephritis in diseases classified elsewhere 2004    hospitalized     Tubal pregnancy      Patient Active Problem List   Diagnosis     Other abnormal Papanicolaou smear of cervix and cervical HPV(795.09)     CARDIOVASCULAR SCREENING; LDL GOAL LESS THAN 160     Hypertension  "goal BP (blood pressure) < 140/80     Primary anovulatory infertility     Anxiety     S/P laparoscopic sleeve gastrectomy     Nausea     Morbid obesity (H)     Mild dehydration     Generalized anxiety disorder     Dysphagia     Attention deficit hyperactivity disorder (ADHD), combined type     Abnormal glandular Papanicolaou smear of cervix     [unfilled]    Past Surgical History  She has a past surgical history that includes appendectomy; Laparoscopy diagnostic (general) (Right, 9/3/2014); Pr Lap, Kerri Restrict Proc, Longitudinal Gastrectomy (N/A, 9/14/2020); Pr Esophagogastroduodenoscopy Transoral Diagnostic (N/A, 11/13/2020); and Pr Esophagogastroduodenoscopy Transoral Diagnostic (N/A, 11/25/2020).     Examination   Ht 1.676 m (5' 6\")   Wt 67.1 kg (148 lb)   BMI 23.89 kg/m      GENERAL: Healthy, alert and no distress  EYES: Eyes grossly normal to inspection.  No discharge or erythema, or obvious scleral/conjunctival abnormalities.  RESP: No audible wheeze, cough, or visible cyanosis.  No visible retractions or increased work of breathing.    SKIN: Visible skin clear. No significant rash, abnormal pigmentation or lesions.  NEURO: Cranial nerves grossly intact.  Mentation and speech appropriate for age.  PSYCH: Mentation appears normal, affect normal/bright, judgement and insight intact, normal speech and appearance well-groomed.         Counseling:   We reviewed the important post op bariatric recommendations:  -eating 3 meals daily  -eating protein first, getting >60gm protein daily  -eating slowly, chewing food well  -avoiding/limiting calorie containing beverages  -drinking water 15-30 minutes before or after meals  -choosing wheat, not white with breads, crackers, pastas, harvey, bagels, tortillas, rice  -limiting restaurant or cafeteria eating to twice a week or less    We discussed the importance of restorative sleep and stress management in maintaining a healthy weight.  We discussed the National " Weight Control Registry healthy weight maintenance strategies and ways to optimize metabolism.  We discussed the importance of physical activity including cardiovascular and strength training in maintaining a healthier weight.  We discussed the importance of life-long vitamin supplementation and life-long  follow-up.    Shameka was reminded that, to avoid marginal ulcers she should avoid tobacco at all, alcohol in excess, caffeine in excess, and NSAIDS (unless indicated for cardioprotection or othewise and opposed by a PPI).    LANIE Post MD  [facility] Bariatric clinic  9/19/2022  7:05 AM            No images are attached to the encounter.      Shameka Maurice is 36 year old  female who presents for a billable video visit today.    How would you like to obtain your AVS? MyChart  If dropped from the video visit, the video invitation should be resent by: Text to cell phone: [phone]  Will anyone else be joining your video visit? No      Video Start Time: 9:15 am    Are there any specific questions or needs that you would like addressed at your visit today? Excess skin      Video-Visit Details    Type of service:  Video Visit    Video End Time (time video stopped): 9:32 AM  Originating Location (pt. Location):in her car in [place]    Distant Location (provider location):  [facility] BAR[facility]ATR[facility] CARE [place]     Platform used for Video Visit: Kimberly      Again, thank you for allowing me to participate in the care of your patient.        Sincerely,        LANIE Post MD

## 2022-09-19 NOTE — PROGRESS NOTES
Shameka THURMAN Josh Maurice is 36 year old  female who presents for a billable video visit today.    How would you like to obtain your AVS? MyChart  If dropped from the video visit, the video invitation should be resent by: Text to cell phone: [phone]  Will anyone else be joining your video visit? No      Video Start Time: 9:15 am    Are there any specific questions or needs that you would like addressed at your visit today? Excess skin      Video-Visit Details    Type of service:  Video Visit    Video End Time (time video stopped): 9:32 AM  Originating Location (pt. Location):in her car in [place]    Distant Location (provider location):  [facility] SUR[facility]ERY CLINIC AND BARIATRICS CARE [place]     Platform used for Video Visit: Kimberly

## 2022-10-21 ENCOUNTER — OFFICE VISIT (OUTPATIENT)
Dept: PLASTIC SURGERY | Facility: AMBULATORY SURGERY CENTER | Age: 37
End: 2022-10-21
Payer: COMMERCIAL

## 2022-10-21 VITALS
HEART RATE: 68 BPM | BODY MASS INDEX: 24.53 KG/M2 | WEIGHT: 152.6 LBS | DIASTOLIC BLOOD PRESSURE: 50 MMHG | SYSTOLIC BLOOD PRESSURE: 107 MMHG | HEIGHT: 66 IN

## 2022-10-21 DIAGNOSIS — E65 ABDOMINAL PANNUS: ICD-10-CM

## 2022-10-21 DIAGNOSIS — Z98.890 S/P PANNICULECTOMY: Primary | ICD-10-CM

## 2022-10-21 DIAGNOSIS — L30.4 INTERTRIGO: ICD-10-CM

## 2022-10-21 PROCEDURE — 99204 OFFICE O/P NEW MOD 45 MIN: CPT | Performed by: PLASTIC SURGERY

## 2022-10-21 NOTE — PROGRESS NOTES
Chief complaint:  Abdominal wall lipodystrophy, hanging abdominal wall pannus.     History of present illness:  This is 36 year old lady who presents with diffuse abdominal wall lipodystrophy as well as skin laxity and hanging abdominal wall pannus over the suprapubic region.  This patient does have history of laparoscopic sleeve gastrectomy on 2020 and has lost significant amount of weight: Approximately 150 pounds.  Her weight has been stable for the last 16 months.  Now she is presenting with abdominal wall skin laxity and hanging abdominal wall pannus.  She complains of chronic and recurrent episodes of intertrigo and dermatitis in the lower infra abdominal fold that she has been treated chronically with nystatin powder. She has been dealing with this friction dermatitis for more than 1 year and uses lotion and nystatin powder to treat this condition. Once again, she has been using this antifungal powder for the intertrigo for more than 1 year after her skin became loose. Patient has been referred to me for consultation for panniculectomy in order to prevent further episodes of intertrigo in the infraabdominal fold area.     Past medical history:  Past Medical History:   Diagnosis Date     ADHD      Anxiety      Bacterial vaginosis      History of UTI      Kidney infection      Miscarriage      Nongonococcal urethritis (INDU) due to chlamydia trachomatis     incommunicado until visit      Ovarian cyst      Papanicolaou smear of cervix with atypical squamous cells of undetermined significance (ASC-US) 2005    HPV positive - did not follow up utnil      Pyelitis or pyelonephritis in diseases classified elsewhere     hospitalized     Tubal pregnancy         Past surgical history:  Laparoscopic sleeve gastrectomy, laparoscopic appendectomy,  x1     Allergies:  Sulfa, oxycodone and morphine.    Patient can take Dilaudid, Percocet and Vicodin.     Medications:       Current  Outpatient Medications:      ADDERALL XR 20 MG 24 hr capsule, Take 1 capsule by mouth daily, Disp: , Rfl:      amphetamine-dextroamphetamine (ADDERALL) 10 MG tablet, Take 1 tablet by mouth daily, Disp: , Rfl:      citalopram (CELEXA) 20 MG tablet, , Disp: , Rfl:      cyanocobalamin (VITAMIN B-12) 1000 MCG SUBL sublingual tablet, Place under the tongue daily, Disp: , Rfl:      cyclobenzaprine (FLEXERIL) 10 MG tablet, Take 10 mg by mouth, Disp: , Rfl:      fluconazole (DIFLUCAN) 150 MG tablet, Take by mouth once weekly, Disp: , Rfl:      LORazepam (ATIVAN) 0.5 MG tablet, Take 0.5 mg by mouth, Disp: , Rfl:      metroNIDAZOLE (METROGEL) 0.75 % vaginal gel, Use vaginally once weekly for 16 wks, Disp: , Rfl:      norelgestromin-ethinyl estradiol (XULANE) 150-35 MCG/24HR patch, Place 1 patch onto the skin, Disp: , Rfl:      vitamin D3 (CHOLECALCIFEROL) 50 mcg (2000 units) tablet, Take 1 tablet by mouth daily, Disp: , Rfl:     BIRTH CONTROL: Birth control patch     Family history:  Mother with history of venous Thromboembolism (VTE)       GYN history:  G  3, P  2, 1 tubal pregnancy     Social History:  Denies tobacco, drinks alcohol rarely     Review of systems:  General ROS: No complaints or constitutional symptoms  Skin: Patient does complain of recurrent and chronic episodes of intertrigo in the infra abdominal fold  Hematologic/Lymphatic: No symptoms or complaints  Psychiatric: No symptoms or complaints  Endocrine: No excessive fatigue, no hypermetabolic symptoms reported  Respiratory ROS: No cough, shortness of breath, or wheezing  Cardiovascular ROS: No chest pain or dyspnea on exertion  Breast ROS: Denies nipple discharge, denies nipple inversion, denies palpable breast masses, denies changes in the color of the skin of both breasts  Gastrointestinal ROS: No abdominal pain, nausea, diarrhea, or constipation  Musculoskeletal ROS: Occasional back pain.  Neurological ROS: No focal neurologic defects reported.      "  Physical exam:     /50 (BP Location: Right arm, Patient Position: Sitting)   Pulse 68   Ht 1.676 m (5' 6\")   Wt 69.2 kg (152 lb 9.6 oz)   LMP 10/21/2022 (Approximate)   Breastfeeding No   BMI 24.63 kg/m    General: Alert, cooperative, appears stated age   Skin: Skin color, texture, turgor normal, no rashes or lesions   Lymphatic: No obvious adenopathy, no swelling   Eyes: No scleral icterus, pupils equal  HENT: No traumatic injury to the head or face, no gross abnormalities  Lungs: Normal respiratory effort, breath sounds equal bilaterally  Heart: Regular rate and rhythm  Breasts: Not examined  Abdomen: Presents with diffuse and severe skin laxity throughout the whole anterior abdominal wall.  This skin laxity is more evident during flexion of her torso over the pelvis.  There is important amount of loose skin on bilateral flank areas as well as in both supraumbilical and infraumbilical region.  There is hanging abdominal wall pannus over the suprapubic region.  The umbilicus is deformed secondary to the weight of the supraumbilical lipodystrophy and loose skin.  The umbilicus is also inferiorly displaced secondary to the weight of the infra umbilical hanging pannus that is pulling the umbilicus down. Patient does present with diastases recti.  I did not palpate any evidence of umbilical hernias or incisional hernias.  Abdomen is otherwise soft, non-distended and non-tender to palpation.  Extremities: Patient does not present with varicose veins.  Neurologic: Grossly intact                                      Assessment:     36 year old female with excessive abdominal skin laxity and hanging abdominal wall pannus with inferiorly displaced umbilicus.  She also presents with diastases recti.      She does have history of chronic and recurrent episodes of intertrigo in the suprapubic region for more than 1 year, despite local treatment with nystatin powder and lotion in the area.      Her Caprini score " is as follows: three-points because family history of DVT, two-point because any body contouring procedure will take more than 45 minutes, one-point because of use of birth control patch. Total risk factor score is 6 points.  Patient will not require postoperative chemoprophylaxis because the score is less than 7.        PLAN:     Patient is a candidate for a [id number]'s panniculectomy secondary to the excessive loose skin in both supra and infraumbilical region as well as bilateral flank.     Patient told me that she is interested in having this procedure performed.     I discussed with her that this is an outpatient procedure that usually last between 4 to 5 hours.       We also discussed the potential risks for surgery.     The risks of surgery include but are not limited to scarring, midline vertical incision and horizontal infraumbilical incision, wound dehiscence, wound healing problems, bleeding and hematoma, infection, seroma, contour irregularities, intraperitoneal injury requiring laparotomy, venous thromboembolism (VTE), risk for further surgeries.  Patient has acknowledged all these risks and would like to proceed with surgery.     Time spent with patient 45 minutes.    Corbin Scott MD , FACS   Diplomate American Board of Plastic Surgery  Diplomate American Board of Surgery  Adj. Assistant Professor of Surgery  Division of Plastic & Reconstructive Surgery   [facility]  Office: [phone]   10/21/2022 at 9:20 AM

## 2022-10-21 NOTE — NURSING NOTE
[facility] Informed Consent for Photography, Filming and Interviewing from the clinic was given to the patient, reviewed, signed and sent for scanning.      Gisselle Locke RN on 10/21/2022 at 9:02 AM

## 2022-10-21 NOTE — LETTER
10/21/2022         RE: Shameka Maurice  [id number]  [facility] [address]        Dear Colleague,    Thank you for referring your patient, Shameka Maurice, to the [facility]. Please see a copy of my visit note below.    Chief complaint:  Abdominal wall lipodystrophy, hanging abdominal wall pannus.     History of present illness:  This is 36 year old lady who presents with diffuse abdominal wall lipodystrophy as well as skin laxity and hanging abdominal wall pannus over the suprapubic region.  This patient does have history of laparoscopic sleeve gastrectomy on 2020 and has lost significant amount of weight: Approximately 150 pounds.  Her weight has been stable for the last several months.  Now she is presenting with abdominal wall skin laxity and hanging abdominal wall pannus.  She complains of chronic and recurrent episodes of intertrigo and dermatitis in the lower infra abdominal fold that she has been treated chronically with nystatin powder. She is interested in a panniculectomy.     Past medical history:  Past Medical History:   Diagnosis Date     ADHD      Anxiety      Bacterial vaginosis      History of UTI      Kidney infection      Miscarriage      Nongonococcal urethritis (INDU) due to chlamydia trachomatis     incommunicado until visit      Ovarian cyst      Papanicolaou smear of cervix with atypical squamous cells of undetermined significance (ASC-US) 2005    HPV positive - did not follow up utnil      Pyelitis or pyelonephritis in diseases classified elsewhere     hospitalized     Tubal pregnancy         Past surgical history:  Laparoscopic sleeve gastrectomy, laparoscopic appendectomy,  x1     Allergies:  Sulfa, oxycodone and morphine.    Patient can take Dilaudid, Percocet and Vicodin.     Medications:       Current Outpatient Medications:      ADDERALL XR 20 MG 24 hr capsule, Take 1 capsule by mouth daily, Disp: , Rfl:       amphetamine-dextroamphetamine (ADDERALL) 10 MG tablet, Take 1 tablet by mouth daily, Disp: , Rfl:      citalopram (CELEXA) 20 MG tablet, , Disp: , Rfl:      cyanocobalamin (VITAMIN B-12) 1000 MCG SUBL sublingual tablet, Place under the tongue daily, Disp: , Rfl:      cyclobenzaprine (FLEXERIL) 10 MG tablet, Take 10 mg by mouth, Disp: , Rfl:      fluconazole (DIFLUCAN) 150 MG tablet, Take by mouth once weekly, Disp: , Rfl:      LORazepam (ATIVAN) 0.5 MG tablet, Take 0.5 mg by mouth, Disp: , Rfl:      metroNIDAZOLE (METROGEL) 0.75 % vaginal gel, Use vaginally once weekly for 16 wks, Disp: , Rfl:      norelgestromin-ethinyl estradiol (XULANE) 150-35 MCG/24HR patch, Place 1 patch onto the skin, Disp: , Rfl:      vitamin D3 (CHOLECALCIFEROL) 50 mcg (2000 units) tablet, Take 1 tablet by mouth daily, Disp: , Rfl:     BIRTH CONTROL: Birth control patch     Family history:  Mother with history of venous Thromboembolism (VTE)       GYN history:  G  3, P  2, 1 tubal pregnancy     Social History:  Denies tobacco, drinks alcohol rarely     Review of systems:  General ROS: No complaints or constitutional symptoms  Skin: Patient does complain of recurrent and chronic episodes of intertrigo in the infra abdominal fold  Hematologic/Lymphatic: No symptoms or complaints  Psychiatric: No symptoms or complaints  Endocrine: No excessive fatigue, no hypermetabolic symptoms reported  Respiratory ROS: No cough, shortness of breath, or wheezing  Cardiovascular ROS: No chest pain or dyspnea on exertion  Breast ROS: Denies nipple discharge, denies nipple inversion, denies palpable breast masses, denies changes in the color of the skin of both breasts  Gastrointestinal ROS: No abdominal pain, nausea, diarrhea, or constipation  Musculoskeletal ROS: Occasional back pain.  Neurological ROS: No focal neurologic defects reported.       Physical exam:     /50 (BP Location: Right arm, Patient Position: Sitting)   Pulse 68   Ht 1.676 m (5'  "6\")   Wt 69.2 kg (152 lb 9.6 oz)   LMP 10/21/2022 (Approximate)   Breastfeeding No   BMI 24.63 kg/m    General: Alert, cooperative, appears stated age   Skin: Skin color, texture, turgor normal, no rashes or lesions   Lymphatic: No obvious adenopathy, no swelling   Eyes: No scleral icterus, pupils equal  HENT: No traumatic injury to the head or face, no gross abnormalities  Lungs: Normal respiratory effort, breath sounds equal bilaterally  Heart: Regular rate and rhythm  Breasts: Not examined  Abdomen: Presents with diffuse and severe skin laxity throughout the whole anterior abdominal wall.  This skin laxity is more evident during flexion of her torso over the pelvis.  There is important amount of loose skin on bilateral flank areas as well as in both supraumbilical and infraumbilical region.  There is hanging abdominal wall pannus over the suprapubic region.  The umbilicus is deformed secondary to the weight of the supraumbilical lipodystrophy and loose skin.  The umbilicus is also inferiorly displaced secondary to the weight of the infra umbilical hanging pannus that is pulling the umbilicus down. Patient does present with diastases recti.  I did not palpate any evidence of umbilical hernias or incisional hernias.  Abdomen is otherwise soft, non-distended and non-tender to palpation.  Extremities: Patient does not present with varicose veins.  Neurologic: Grossly intact                                      Assessment:     36 year old female with excessive abdominal skin laxity and hanging abdominal wall pannus with inferiorly displaced umbilicus.  She also presents with diastases recti.  She does have history of chronic and recurrent episodes of intertrigo in the suprapubic region.     Her Caprini score is as follows: three-points because family history of DVT, two-point because any body contouring procedure will take more than 45 minutes, one-point because of use of birth control patch. Total risk factor " score is 6 points.  Patient will not require postoperative chemoprophylaxis because the score is less than 7.        PLAN:     Patient is a candidate for a [id number]-[id number]-vza's panniculectomy secondary to the excessive loose skin in both supra and infraumbilical region as well as bilateral flank.     Patient told me that she is interested in having this procedure performed.     I discussed with her that this is an outpatient procedure that usually last between 4 to 5 hours.       We also discussed the potential risks for surgery.     The risks of surgery include but are not limited to scarring, midline vertical incision and horizontal infraumbilical incision, wound dehiscence, wound healing problems, bleeding and hematoma, infection, seroma, contour irregularities, intraperitoneal injury requiring laparotomy, venous thromboembolism (VTE), risk for further surgeries.  Patient has acknowledged all these risks and would like to proceed with surgery.     Time spent with patient 45 minutes.    Corbin Scott MD , FACS   Diplomate American Board of Plastic Surgery  Diplomate American Board of Surgery  Adj. Assistant Professor of Surgery  Division of Plastic & Reconstructive Surgery   [facility]  Office: [phone]   10/21/2022 at 9:20 AM         Again, thank you for allowing me to participate in the care of your patient.        Sincerely,        Corbin Scott MD

## 2022-11-19 ENCOUNTER — HEALTH MAINTENANCE LETTER (OUTPATIENT)
Age: 37
End: 2022-11-19

## 2022-11-30 ENCOUNTER — TELEPHONE (OUTPATIENT)
Dept: PLASTIC SURGERY | Facility: AMBULATORY SURGERY CENTER | Age: 37
End: 2022-11-30

## 2022-11-30 NOTE — TELEPHONE ENCOUNTER
Spoke with patient today regarding surgery scheduling     Went over details/instructions.    Surgery Letter sent via [employer]

## 2022-11-30 NOTE — LETTER
We've received instruction to get you scheduled for surgery with Dr Scott. We have that arranged as follows:     Pre-op Physical:  Call your primary clinic to schedule.    Surgery Date: 5/1/2023     Location: [facility].  [facility] [place] [address]    Approximate Arrival Time: 5:30 am  (Unless instructed differently by the pre-op call nurse)     [facility] op [facility]: [facility] [facility] [address], [place] [address].    Prep Tasks and Info:     1. Schedule a pre-op physical with your primary care doctor within 30 days of surgery. This is required by anesthesia and if not done, your surgery will be cancelled. Call them asap to get this scheduled.    2. Review your medications with your primary care or prescribing physician; they will advise you which meds to stop and when, and when you can resume taking.  Certain medications like blood thinners need to be stopped in advance of surgery to proceed safely.    3. Please shower the evening before and morning of surgery with Hibiclens or Exidine soap.  This can be found at your local pharmacy.    4. Fasting instructions will be provided by the pre-op nurse who will call you 1-3 days before surgery.  Typically we advise normal food up to 8 hours before surgery then clear liquids only up to 1 hour before surgery then nothing at all by mouth for 2 hours including no gum or candy.  The nurse will review your specific instructions with you at the call.      5. Smoking impacts your body's ability to heal properly.  If you are a smoker, we strongly urge you to stop smoking 4-6 weeks before surgery. Plastic surgery patients are required to be nicotine free for 6-8 weeks before surgery.     6. You will need an adult to drive you home and stay with you 24 hours after surgery. Public transportation or Medical Van Services are not permitted.    7. You may have two  visitors wait in the lobby at the surgery center during your surgery. Visitor restrictions are subject to change, please verify with the pre-op nurse when they call.    8. If the community sees a new COVID19 surge, your procedure may need to be postponed. We will contact you if this happens. You will be screened for high-risk exposure to Covid-19 during the pre-op call.  We encourage you to quarantine yourself away from any Covid-19 people for 10 days before surgery to avoid possible last minute cancellations.   When you arrive to the surgery center, you will again be screened for COVID19 symptoms. If you screen positive, your surgery will need to be postponed.    9. We always encourage you to notify your insurance any time you have medical tests or procedures scheduled including surgery. The number is usually right on the back of your insurance card. Please call [facility] Cost of Care at [phone] if you'd like a surgery quote.       Call our office if you have any questions! Thank you!

## 2023-04-30 ENCOUNTER — ANESTHESIA EVENT (OUTPATIENT)
Dept: SURGERY | Facility: HOSPITAL | Age: 38
End: 2023-04-30
Payer: COMMERCIAL

## 2023-04-30 NOTE — ANESTHESIA PREPROCEDURE EVALUATION
Anesthesia Pre-Procedure Evaluation    Patient: Shameka Maurice   MRN: [id number] : 1985        Procedure : Procedure(s):  Danielle junaid Lis Panniculectomy with vertical component          Past Medical History:   Diagnosis Date     ADHD      Anxiety      Bacterial vaginosis      History of UTI      Kidney infection      Miscarriage      Nongonococcal urethritis (INDU) due to chlamydia trachomatis     incommunicado until visit      Ovarian cyst      Papanicolaou smear of cervix with atypical squamous cells of undetermined significance (ASC-US) 2005    HPV positive - did not follow up utnil      Pyelitis or pyelonephritis in diseases classified elsewhere     hospitalized     Tubal pregnancy       Past Surgical History:   Procedure Laterality Date     APPENDECTOMY       BARIATRIC SURGERY       LAPAROSCOPY DIAGNOSTIC (GENERAL) Right 2014    Procedure: Laparoscopy with Right Salpingectomy;  Surgeon: Dallas Laird MD;  [place] [facility]:      MT ESOPHAGOGASTRODUODENOSCOPY TRANSORAL DIAGNOSTIC N/A 2020    Procedure: ESOPHAGOGASTRODUODENOSCOPY (EGD) with biopsy;  Surgeon: Jameson Handy MD;  [facility];  Service: General     MT ESOPHAGOGASTRODUODENOSCOPY TRANSORAL DIAGNOSTIC N/A 2020    Procedure: ESOPHAGOGASTRODUODENOSCOPY (EGD)with dialation of sleeve gastrectomy;  Surgeon: Jameson Handy MD;  Location: [facility];  Service: General     MT LAP, JACQUES RESTRICT PROC, LONGITUDINAL GASTRECTOMY N/A 2020    Procedure: LAPAROSCOPIC SLEEVE GASTRECTOMY;  Surgeon: Jameson Handy MD;  Location: [facility];  Service: General      Allergies   Allergen Reactions     Hydroxyzine Other (See Comments)     agitation     Morphine Nausea and Vomiting     Septra [Bactrim] Rash     Sulfa Antibiotics Hives      Social History     Tobacco Use     Smoking status: Former     Packs/day: 0.50     Years: 15.00     Pack years:  7.50     Types: Cigarettes     Quit date: 8/1/2019     Years since quitting: 3.7     Smokeless tobacco: Never     Tobacco comments:     3-4 daily   Vaping Use     Vaping status: Not on file   Substance Use Topics     Alcohol use: Not Currently     Comment: occ.      Wt Readings from Last 1 Encounters:   10/21/22 69.2 kg (152 lb 9.6 oz)        Anesthesia Evaluation   Pt has had prior anesthetic.     No history of anesthetic complications       ROS/MED HX  ENT/Pulmonary:     (+) tobacco use, Past use,  (-) asthma, COPD, sleep apnea, KAITLIN risk factors and recent URI   Neurologic:    (-) no seizures and no CVA   Cardiovascular:    (-) hypertension, taking anticoagulants/antiplatelets, syncope and arrhythmias   METS/Exercise Tolerance: >4 METS    Hematologic:    (-) anemia   Musculoskeletal:       GI/Hepatic: Comment: S/p sleeve gastrectomy    (+) GERD,     Renal/Genitourinary:    (-) renal disease   Endo: Comment: Infertility    (+) Obesity,  (-) Type I DM, Type II DM and thyroid disease   Psychiatric/Substance Use: Comment: ADHD combined type    (+) psychiatric history anxiety and other (comment)  (-) chronic opioid use history   Infectious Disease:    (-) Recent Fever   Malignancy:       Other:            Physical Exam    Airway        Mallampati: II   TM distance: > 3 FB   Neck ROM: full   Mouth opening: > 3 cm    Respiratory Devices and Support         Dental     Comment: Fair dentition, no loose or removable teeth        Cardiovascular          Rhythm and rate: regular and normal     Pulmonary   pulmonary exam normal            Other findings:   Hbg 13.5  Plt 217  Na 139  K 3.8  BUN/Cr 10/0.8    OUTSIDE LABS:  CBC:   Lab Results   Component Value Date    WBC 8.1 05/21/2020    WBC 6.9 09/10/2019    HGB 14.0 05/21/2020    HGB 14.6 09/10/2019    HCT 41.2 05/21/2020    HCT 42.6 09/10/2019     05/21/2020     09/10/2019     BMP:   Lab Results   Component Value Date     05/21/2020     09/10/2019     POTASSIUM 4.7 05/21/2020    POTASSIUM 4.4 09/10/2019    CHLORIDE 105 05/21/2020    CHLORIDE 105 09/10/2019    CO2 24 05/21/2020    CO2 24 09/10/2019    BUN 10 05/21/2020    BUN 15 09/10/2019    CR 0.77 05/21/2020    CR 0.77 09/10/2019    GLC 82 05/21/2020    GLC 76 09/10/2019     COAGS:   Lab Results   Component Value Date    PTT 28 05/21/2020    INR 1.03 05/21/2020     POC:   Lab Results   Component Value Date    HCG Negative 09/14/2020    HCGS Negative 09/19/2005     HEPATIC:   Lab Results   Component Value Date    ALBUMIN 4.0 05/21/2020    PROTTOTAL 7.2 05/21/2020    ALT 14 05/21/2020    AST 15 05/21/2020    ALKPHOS 28 (L) 05/21/2020    BILITOTAL 0.4 05/21/2020     OTHER:   Lab Results   Component Value Date    A1C 5.6 08/07/2019    MINDY 9.6 05/21/2020    LIPASE 51 12/28/2005    TSH 2.09 08/07/2019    SED 10 08/27/2013       Anesthesia Plan    ASA Status:  2   NPO Status:  NPO Appropriate    Anesthesia Type: General.     - Airway: ETT      Maintenance: TIVA.        Consents    Anesthesia Plan(s) and associated risks, benefits, and realistic alternatives discussed. Questions answered and patient/representative(s) expressed understanding.    - Discussed:     - Discussed with:  Patient      - Extended Intubation/Ventilatory Support Discussed: No.      - Patient is DNR/DNI Status: No    Use of blood products discussed: No .     Postoperative Care    Pain management: IV analgesics, Multi-modal analgesia.   PONV prophylaxis: Ondansetron (or other 5HT-3), Dexamethasone or Solumedrol     Comments:    Other Comments:   GETA  TIVA  Acetaminophen/Mg 4 g (pre-op), ketamine on induction  Dexamethasone, ondansetron PONV ppx             Flori Maria MD

## 2023-05-01 ENCOUNTER — ANESTHESIA (OUTPATIENT)
Dept: SURGERY | Facility: HOSPITAL | Age: 38
End: 2023-05-01
Payer: COMMERCIAL

## 2023-05-01 ENCOUNTER — HOSPITAL ENCOUNTER (OUTPATIENT)
Facility: HOSPITAL | Age: 38
Discharge: HOME | End: 2023-05-02
Attending: PLASTIC SURGERY | Admitting: PLASTIC SURGERY
Payer: COMMERCIAL

## 2023-05-01 DIAGNOSIS — Z98.890 STATUS POST PANNICULECTOMY: Primary | ICD-10-CM

## 2023-05-01 LAB
CREAT SERPL-MCNC: 0.65 MG/DL (ref 0.51–0.95)
GFR SERPL CREATININE-BSD FRML MDRD: >90 ML/MIN/1.73M2

## 2023-05-01 PROCEDURE — 250N000011 HC RX IP 250 OP 636: Performed by: ANESTHESIOLOGY

## 2023-05-01 PROCEDURE — 250N000009 HC RX 250: Performed by: NURSE ANESTHETIST, CERTIFIED REGISTERED

## 2023-05-01 PROCEDURE — 15830 EXC EXCESSIVE SKIN ABDOMEN: CPT | Performed by: PLASTIC SURGERY

## 2023-05-01 PROCEDURE — 88304 TISSUE EXAM BY PATHOLOGIST: CPT | Mod: TC | Performed by: PLASTIC SURGERY

## 2023-05-01 PROCEDURE — 258N000001 HC RX 258: Performed by: PLASTIC SURGERY

## 2023-05-01 PROCEDURE — 250N000013 HC RX MED GY IP 250 OP 250 PS 637: Performed by: PLASTIC SURGERY

## 2023-05-01 PROCEDURE — 258N000003 HC RX IP 258 OP 636: Performed by: NURSE ANESTHETIST, CERTIFIED REGISTERED

## 2023-05-01 PROCEDURE — 258N000003 HC RX IP 258 OP 636: Performed by: ANESTHESIOLOGY

## 2023-05-01 PROCEDURE — 710N000009 HC RECOVERY PHASE 1, LEVEL 1, PER MIN: Performed by: PLASTIC SURGERY

## 2023-05-01 PROCEDURE — 370N000017 HC ANESTHESIA TECHNICAL FEE, PER MIN: Performed by: PLASTIC SURGERY

## 2023-05-01 PROCEDURE — 258N000003 HC RX IP 258 OP 636: Performed by: PLASTIC SURGERY

## 2023-05-01 PROCEDURE — 36415 COLL VENOUS BLD VENIPUNCTURE: CPT | Performed by: PLASTIC SURGERY

## 2023-05-01 PROCEDURE — 88304 TISSUE EXAM BY PATHOLOGIST: CPT | Mod: 26 | Performed by: PATHOLOGY

## 2023-05-01 PROCEDURE — 250N000025 HC SEVOFLURANE, PER MIN: Performed by: PLASTIC SURGERY

## 2023-05-01 PROCEDURE — 272N000001 HC OR GENERAL SUPPLY STERILE: Performed by: PLASTIC SURGERY

## 2023-05-01 PROCEDURE — 999N000141 HC STATISTIC PRE-PROCEDURE NURSING ASSESSMENT: Performed by: PLASTIC SURGERY

## 2023-05-01 PROCEDURE — 360N000076 HC SURGERY LEVEL 3, PER MIN: Performed by: PLASTIC SURGERY

## 2023-05-01 PROCEDURE — 250N000013 HC RX MED GY IP 250 OP 250 PS 637: Performed by: ANESTHESIOLOGY

## 2023-05-01 PROCEDURE — 82565 ASSAY OF CREATININE: CPT | Performed by: PLASTIC SURGERY

## 2023-05-01 PROCEDURE — 250N000011 HC RX IP 250 OP 636: Performed by: PLASTIC SURGERY

## 2023-05-01 PROCEDURE — 250N000009 HC RX 250: Performed by: PLASTIC SURGERY

## 2023-05-01 PROCEDURE — 250N000011 HC RX IP 250 OP 636: Performed by: NURSE ANESTHETIST, CERTIFIED REGISTERED

## 2023-05-01 PROCEDURE — 710N000012 HC RECOVERY PHASE 2, PER MINUTE: Performed by: PLASTIC SURGERY

## 2023-05-01 RX ORDER — ONDANSETRON 2 MG/ML
4 INJECTION INTRAMUSCULAR; INTRAVENOUS EVERY 6 HOURS PRN
Status: DISCONTINUED | OUTPATIENT
Start: 2023-05-01 | End: 2023-05-02 | Stop reason: HOSPADM

## 2023-05-01 RX ORDER — DEXTROSE MONOHYDRATE, SODIUM CHLORIDE, AND POTASSIUM CHLORIDE 50; 1.49; 4.5 G/1000ML; G/1000ML; G/1000ML
INJECTION, SOLUTION INTRAVENOUS CONTINUOUS
Status: DISCONTINUED | OUTPATIENT
Start: 2023-05-01 | End: 2023-05-02 | Stop reason: HOSPADM

## 2023-05-01 RX ORDER — PROCHLORPERAZINE MALEATE 10 MG
10 TABLET ORAL EVERY 6 HOURS PRN
Status: DISCONTINUED | OUTPATIENT
Start: 2023-05-01 | End: 2023-05-02 | Stop reason: HOSPADM

## 2023-05-01 RX ORDER — FENTANYL CITRATE 50 UG/ML
25 INJECTION, SOLUTION INTRAMUSCULAR; INTRAVENOUS EVERY 5 MIN PRN
Status: DISCONTINUED | OUTPATIENT
Start: 2023-05-01 | End: 2023-05-01 | Stop reason: HOSPADM

## 2023-05-01 RX ORDER — NALOXONE HYDROCHLORIDE 0.4 MG/ML
0.2 INJECTION, SOLUTION INTRAMUSCULAR; INTRAVENOUS; SUBCUTANEOUS
Status: DISCONTINUED | OUTPATIENT
Start: 2023-05-01 | End: 2023-05-02 | Stop reason: HOSPADM

## 2023-05-01 RX ORDER — POLYETHYLENE GLYCOL 3350 17 G/17G
17 POWDER, FOR SOLUTION ORAL DAILY
Status: DISCONTINUED | OUTPATIENT
Start: 2023-05-02 | End: 2023-05-02 | Stop reason: HOSPADM

## 2023-05-01 RX ORDER — GINSENG 100 MG
CAPSULE ORAL PRN
Status: DISCONTINUED | OUTPATIENT
Start: 2023-05-01 | End: 2023-05-01 | Stop reason: HOSPADM

## 2023-05-01 RX ORDER — NALOXONE HYDROCHLORIDE 0.4 MG/ML
0.4 INJECTION, SOLUTION INTRAMUSCULAR; INTRAVENOUS; SUBCUTANEOUS
Status: DISCONTINUED | OUTPATIENT
Start: 2023-05-01 | End: 2023-05-02 | Stop reason: HOSPADM

## 2023-05-01 RX ORDER — MAGNESIUM SULFATE 4 G/50ML
4 INJECTION INTRAVENOUS ONCE
Status: COMPLETED | OUTPATIENT
Start: 2023-05-01 | End: 2023-05-01

## 2023-05-01 RX ORDER — OXYCODONE HYDROCHLORIDE 5 MG/1
5 TABLET ORAL ONCE
Status: COMPLETED | OUTPATIENT
Start: 2023-05-01 | End: 2023-05-01

## 2023-05-01 RX ORDER — CEFAZOLIN SODIUM 1 G/3ML
1 INJECTION, POWDER, FOR SOLUTION INTRAMUSCULAR; INTRAVENOUS EVERY 8 HOURS
Status: COMPLETED | OUTPATIENT
Start: 2023-05-01 | End: 2023-05-02

## 2023-05-01 RX ORDER — HYDROCODONE BITARTRATE AND ACETAMINOPHEN 5; 325 MG/1; MG/1
1-2 TABLET ORAL EVERY 4 HOURS PRN
Qty: 30 TABLET | Refills: 0 | Status: SHIPPED | OUTPATIENT
Start: 2023-05-01

## 2023-05-01 RX ORDER — PROPOFOL 10 MG/ML
INJECTION, EMULSION INTRAVENOUS PRN
Status: DISCONTINUED | OUTPATIENT
Start: 2023-05-01 | End: 2023-05-01

## 2023-05-01 RX ORDER — HYDROMORPHONE HYDROCHLORIDE 2 MG/1
2 TABLET ORAL EVERY 4 HOURS PRN
Status: DISCONTINUED | OUTPATIENT
Start: 2023-05-01 | End: 2023-05-02 | Stop reason: HOSPADM

## 2023-05-01 RX ORDER — SODIUM CHLORIDE, SODIUM LACTATE, POTASSIUM CHLORIDE, CALCIUM CHLORIDE 600; 310; 30; 20 MG/100ML; MG/100ML; MG/100ML; MG/100ML
INJECTION, SOLUTION INTRAVENOUS CONTINUOUS
Status: DISCONTINUED | OUTPATIENT
Start: 2023-05-01 | End: 2023-05-01 | Stop reason: HOSPADM

## 2023-05-01 RX ORDER — PROPOFOL 10 MG/ML
INJECTION, EMULSION INTRAVENOUS CONTINUOUS PRN
Status: DISCONTINUED | OUTPATIENT
Start: 2023-05-01 | End: 2023-05-01

## 2023-05-01 RX ORDER — ONDANSETRON 2 MG/ML
INJECTION INTRAMUSCULAR; INTRAVENOUS PRN
Status: DISCONTINUED | OUTPATIENT
Start: 2023-05-01 | End: 2023-05-01

## 2023-05-01 RX ORDER — CEFAZOLIN SODIUM/WATER 2 G/20 ML
2 SYRINGE (ML) INTRAVENOUS
Status: COMPLETED | OUTPATIENT
Start: 2023-05-01 | End: 2023-05-01

## 2023-05-01 RX ORDER — FENTANYL CITRATE 50 UG/ML
50 INJECTION, SOLUTION INTRAMUSCULAR; INTRAVENOUS EVERY 5 MIN PRN
Status: DISCONTINUED | OUTPATIENT
Start: 2023-05-01 | End: 2023-05-01 | Stop reason: HOSPADM

## 2023-05-01 RX ORDER — CEPHALEXIN 500 MG/1
500 CAPSULE ORAL 3 TIMES DAILY
Qty: 21 CAPSULE | Refills: 0 | Status: SHIPPED | OUTPATIENT
Start: 2023-05-01 | End: 2023-05-08

## 2023-05-01 RX ORDER — ONDANSETRON 4 MG/1
4 TABLET, ORALLY DISINTEGRATING ORAL EVERY 6 HOURS PRN
Status: DISCONTINUED | OUTPATIENT
Start: 2023-05-01 | End: 2023-05-02 | Stop reason: HOSPADM

## 2023-05-01 RX ORDER — HYDROMORPHONE HCL IN WATER/PF 6 MG/30 ML
0.4 PATIENT CONTROLLED ANALGESIA SYRINGE INTRAVENOUS
Status: DISCONTINUED | OUTPATIENT
Start: 2023-05-01 | End: 2023-05-02 | Stop reason: HOSPADM

## 2023-05-01 RX ORDER — LIDOCAINE 40 MG/G
CREAM TOPICAL
Status: DISCONTINUED | OUTPATIENT
Start: 2023-05-01 | End: 2023-05-01 | Stop reason: HOSPADM

## 2023-05-01 RX ORDER — ONDANSETRON 4 MG/1
4 TABLET, ORALLY DISINTEGRATING ORAL EVERY 30 MIN PRN
Status: DISCONTINUED | OUTPATIENT
Start: 2023-05-01 | End: 2023-05-01 | Stop reason: HOSPADM

## 2023-05-01 RX ORDER — AMOXICILLIN 250 MG
1 CAPSULE ORAL 2 TIMES DAILY
Status: DISCONTINUED | OUTPATIENT
Start: 2023-05-01 | End: 2023-05-02 | Stop reason: HOSPADM

## 2023-05-01 RX ORDER — KETAMINE HYDROCHLORIDE 10 MG/ML
INJECTION INTRAMUSCULAR; INTRAVENOUS PRN
Status: DISCONTINUED | OUTPATIENT
Start: 2023-05-01 | End: 2023-05-01

## 2023-05-01 RX ORDER — HYDROMORPHONE HYDROCHLORIDE 4 MG/1
4 TABLET ORAL EVERY 4 HOURS PRN
Status: DISCONTINUED | OUTPATIENT
Start: 2023-05-01 | End: 2023-05-02 | Stop reason: HOSPADM

## 2023-05-01 RX ORDER — DEXAMETHASONE SODIUM PHOSPHATE 10 MG/ML
INJECTION, SOLUTION INTRAMUSCULAR; INTRAVENOUS PRN
Status: DISCONTINUED | OUTPATIENT
Start: 2023-05-01 | End: 2023-05-01

## 2023-05-01 RX ORDER — ACETAMINOPHEN 325 MG/1
975 TABLET ORAL EVERY 8 HOURS
Status: DISCONTINUED | OUTPATIENT
Start: 2023-05-01 | End: 2023-05-02 | Stop reason: HOSPADM

## 2023-05-01 RX ORDER — FENTANYL CITRATE 50 UG/ML
INJECTION, SOLUTION INTRAMUSCULAR; INTRAVENOUS PRN
Status: DISCONTINUED | OUTPATIENT
Start: 2023-05-01 | End: 2023-05-01

## 2023-05-01 RX ORDER — AMOXICILLIN 250 MG
1-2 CAPSULE ORAL 2 TIMES DAILY
Qty: 30 TABLET | Refills: 0 | Status: SHIPPED | OUTPATIENT
Start: 2023-05-01

## 2023-05-01 RX ORDER — LIDOCAINE 40 MG/G
CREAM TOPICAL
Status: DISCONTINUED | OUTPATIENT
Start: 2023-05-01 | End: 2023-05-02 | Stop reason: HOSPADM

## 2023-05-01 RX ORDER — ACETAMINOPHEN 325 MG/1
975 TABLET ORAL ONCE
Status: COMPLETED | OUTPATIENT
Start: 2023-05-01 | End: 2023-05-01

## 2023-05-01 RX ORDER — ONDANSETRON 2 MG/ML
4 INJECTION INTRAMUSCULAR; INTRAVENOUS EVERY 30 MIN PRN
Status: DISCONTINUED | OUTPATIENT
Start: 2023-05-01 | End: 2023-05-01 | Stop reason: HOSPADM

## 2023-05-01 RX ORDER — ACETAMINOPHEN 325 MG/1
650 TABLET ORAL EVERY 4 HOURS PRN
Status: DISCONTINUED | OUTPATIENT
Start: 2023-05-04 | End: 2023-05-02 | Stop reason: HOSPADM

## 2023-05-01 RX ORDER — ONDANSETRON 4 MG/1
4 TABLET, ORALLY DISINTEGRATING ORAL EVERY 8 HOURS PRN
Qty: 16 TABLET | Refills: 0 | Status: SHIPPED | OUTPATIENT
Start: 2023-05-01

## 2023-05-01 RX ORDER — ENOXAPARIN SODIUM 100 MG/ML
40 INJECTION SUBCUTANEOUS EVERY 24 HOURS
Status: DISCONTINUED | OUTPATIENT
Start: 2023-05-02 | End: 2023-05-02 | Stop reason: HOSPADM

## 2023-05-01 RX ORDER — LIDOCAINE HYDROCHLORIDE 10 MG/ML
INJECTION, SOLUTION INFILTRATION; PERINEURAL PRN
Status: DISCONTINUED | OUTPATIENT
Start: 2023-05-01 | End: 2023-05-01

## 2023-05-01 RX ORDER — BISACODYL 10 MG
10 SUPPOSITORY, RECTAL RECTAL DAILY PRN
Status: DISCONTINUED | OUTPATIENT
Start: 2023-05-01 | End: 2023-05-02 | Stop reason: HOSPADM

## 2023-05-01 RX ORDER — HYDROMORPHONE HCL IN WATER/PF 6 MG/30 ML
0.2 PATIENT CONTROLLED ANALGESIA SYRINGE INTRAVENOUS
Status: DISCONTINUED | OUTPATIENT
Start: 2023-05-01 | End: 2023-05-02 | Stop reason: HOSPADM

## 2023-05-01 RX ORDER — GABAPENTIN 100 MG/1
100 CAPSULE ORAL 3 TIMES DAILY
Status: DISCONTINUED | OUTPATIENT
Start: 2023-05-01 | End: 2023-05-02 | Stop reason: HOSPADM

## 2023-05-01 RX ADMIN — HYDROMORPHONE HYDROCHLORIDE 0.5 MG: 1 INJECTION, SOLUTION INTRAMUSCULAR; INTRAVENOUS; SUBCUTANEOUS at 10:59

## 2023-05-01 RX ADMIN — DEXMEDETOMIDINE HYDROCHLORIDE 12 MCG: 100 INJECTION, SOLUTION INTRAVENOUS at 12:56

## 2023-05-01 RX ADMIN — ROCURONIUM BROMIDE 30 MG: 50 INJECTION, SOLUTION INTRAVENOUS at 10:29

## 2023-05-01 RX ADMIN — ONDANSETRON 4 MG: 2 INJECTION INTRAMUSCULAR; INTRAVENOUS at 12:44

## 2023-05-01 RX ADMIN — ACETAMINOPHEN 975 MG: 325 TABLET ORAL at 18:43

## 2023-05-01 RX ADMIN — PROPOFOL 200 MCG/KG/MIN: 10 INJECTION, EMULSION INTRAVENOUS at 07:35

## 2023-05-01 RX ADMIN — ACETAMINOPHEN 975 MG: 325 TABLET ORAL at 07:01

## 2023-05-01 RX ADMIN — ROCURONIUM BROMIDE 50 MG: 50 INJECTION, SOLUTION INTRAVENOUS at 07:35

## 2023-05-01 RX ADMIN — KETAMINE HYDROCHLORIDE 30 MG: 10 INJECTION, SOLUTION INTRAMUSCULAR; INTRAVENOUS at 07:35

## 2023-05-01 RX ADMIN — Medication 2 G: at 11:29

## 2023-05-01 RX ADMIN — ROCURONIUM BROMIDE 20 MG: 50 INJECTION, SOLUTION INTRAVENOUS at 11:24

## 2023-05-01 RX ADMIN — DEXAMETHASONE SODIUM PHOSPHATE 10 MG: 10 INJECTION, SOLUTION INTRAMUSCULAR; INTRAVENOUS at 07:47

## 2023-05-01 RX ADMIN — POTASSIUM CHLORIDE, DEXTROSE MONOHYDRATE AND SODIUM CHLORIDE: 150; 5; 450 INJECTION, SOLUTION INTRAVENOUS at 19:03

## 2023-05-01 RX ADMIN — FENTANYL CITRATE 50 MCG: 50 INJECTION, SOLUTION INTRAMUSCULAR; INTRAVENOUS at 07:35

## 2023-05-01 RX ADMIN — DEXMEDETOMIDINE HYDROCHLORIDE 8 MCG: 100 INJECTION, SOLUTION INTRAVENOUS at 12:53

## 2023-05-01 RX ADMIN — ROCURONIUM BROMIDE 20 MG: 50 INJECTION, SOLUTION INTRAVENOUS at 08:10

## 2023-05-01 RX ADMIN — SODIUM CHLORIDE, POTASSIUM CHLORIDE, SODIUM LACTATE AND CALCIUM CHLORIDE: 600; 310; 30; 20 INJECTION, SOLUTION INTRAVENOUS at 12:50

## 2023-05-01 RX ADMIN — MIDAZOLAM 2 MG: 1 INJECTION INTRAMUSCULAR; INTRAVENOUS at 07:29

## 2023-05-01 RX ADMIN — FENTANYL CITRATE 50 MCG: 50 INJECTION, SOLUTION INTRAMUSCULAR; INTRAVENOUS at 14:07

## 2023-05-01 RX ADMIN — KETAMINE HYDROCHLORIDE 20 MG: 10 INJECTION, SOLUTION INTRAMUSCULAR; INTRAVENOUS at 09:41

## 2023-05-01 RX ADMIN — LIDOCAINE HYDROCHLORIDE 5 ML: 10 INJECTION, SOLUTION INFILTRATION; PERINEURAL at 07:35

## 2023-05-01 RX ADMIN — PROPOFOL 50 MG: 10 INJECTION, EMULSION INTRAVENOUS at 10:27

## 2023-05-01 RX ADMIN — FENTANYL CITRATE 50 MCG: 50 INJECTION, SOLUTION INTRAMUSCULAR; INTRAVENOUS at 13:23

## 2023-05-01 RX ADMIN — FENTANYL CITRATE 50 MCG: 50 INJECTION, SOLUTION INTRAMUSCULAR; INTRAVENOUS at 11:09

## 2023-05-01 RX ADMIN — HYDROMORPHONE HYDROCHLORIDE 0.4 MG: 0.2 INJECTION, SOLUTION INTRAMUSCULAR; INTRAVENOUS; SUBCUTANEOUS at 18:26

## 2023-05-01 RX ADMIN — SODIUM CHLORIDE, POTASSIUM CHLORIDE, SODIUM LACTATE AND CALCIUM CHLORIDE: 600; 310; 30; 20 INJECTION, SOLUTION INTRAVENOUS at 09:13

## 2023-05-01 RX ADMIN — HYDROMORPHONE HYDROCHLORIDE 0.4 MG: 1 INJECTION, SOLUTION INTRAMUSCULAR; INTRAVENOUS; SUBCUTANEOUS at 14:14

## 2023-05-01 RX ADMIN — FENTANYL CITRATE 50 MCG: 50 INJECTION, SOLUTION INTRAMUSCULAR; INTRAVENOUS at 09:04

## 2023-05-01 RX ADMIN — PROPOFOL 150 MG: 10 INJECTION, EMULSION INTRAVENOUS at 07:35

## 2023-05-01 RX ADMIN — HYDROMORPHONE HYDROCHLORIDE 4 MG: 4 TABLET ORAL at 20:36

## 2023-05-01 RX ADMIN — PHENYLEPHRINE HYDROCHLORIDE 50 MCG: 10 INJECTION INTRAVENOUS at 08:25

## 2023-05-01 RX ADMIN — MAGNESIUM SULFATE HEPTAHYDRATE 4 G: 80 INJECTION, SOLUTION INTRAVENOUS at 07:02

## 2023-05-01 RX ADMIN — GABAPENTIN 100 MG: 100 CAPSULE ORAL at 20:36

## 2023-05-01 RX ADMIN — DOCUSATE SODIUM 50 MG AND SENNOSIDES 8.6 MG 1 TABLET: 8.6; 5 TABLET, FILM COATED ORAL at 20:36

## 2023-05-01 RX ADMIN — OXYCODONE HYDROCHLORIDE 5 MG: 5 TABLET ORAL at 16:10

## 2023-05-01 RX ADMIN — SODIUM CHLORIDE, POTASSIUM CHLORIDE, SODIUM LACTATE AND CALCIUM CHLORIDE: 600; 310; 30; 20 INJECTION, SOLUTION INTRAVENOUS at 06:56

## 2023-05-01 RX ADMIN — Medication 2 G: at 07:29

## 2023-05-01 RX ADMIN — CEFAZOLIN 1 G: 1 INJECTION, POWDER, FOR SOLUTION INTRAMUSCULAR; INTRAVENOUS at 19:16

## 2023-05-01 RX ADMIN — HYDROMORPHONE HYDROCHLORIDE 0.4 MG: 1 INJECTION, SOLUTION INTRAMUSCULAR; INTRAVENOUS; SUBCUTANEOUS at 14:00

## 2023-05-01 RX ADMIN — SUGAMMADEX 200 MG: 100 INJECTION, SOLUTION INTRAVENOUS at 12:56

## 2023-05-01 RX ADMIN — HYDROMORPHONE HYDROCHLORIDE 0.5 MG: 1 INJECTION, SOLUTION INTRAMUSCULAR; INTRAVENOUS; SUBCUTANEOUS at 09:01

## 2023-05-01 RX ADMIN — ROCURONIUM BROMIDE 20 MG: 50 INJECTION, SOLUTION INTRAVENOUS at 08:32

## 2023-05-01 ASSESSMENT — ACTIVITIES OF DAILY LIVING (ADL)
ADLS_ACUITY_SCORE: 18
ADLS_ACUITY_SCORE: 21
ADLS_ACUITY_SCORE: 18

## 2023-05-01 ASSESSMENT — LIFESTYLE VARIABLES: TOBACCO_USE: 1

## 2023-05-01 ASSESSMENT — ENCOUNTER SYMPTOMS
DYSRHYTHMIAS: 0
SEIZURES: 0

## 2023-05-01 ASSESSMENT — COPD QUESTIONNAIRES: COPD: 0

## 2023-05-01 NOTE — ANESTHESIA CARE TRANSFER NOTE
Patient: Shameka Maurice    Procedure: Procedure(s):  Danielle junaid Lis Panniculectomy with vertical component       Diagnosis: Abdominal pannus [E65]  Intertrigo [L30.4]  Diagnosis Additional Information: No value filed.    Anesthesia Type:   General     Note:    Oropharynx: oropharynx clear of all foreign objects and spontaneously breathing  Level of Consciousness: drowsy  Oxygen Supplementation: face mask  Level of Supplemental Oxygen (L/min / FiO2): 6  Independent Airway: airway patency satisfactory and stable  Dentition: dentition unchanged  Vital Signs Stable: post-procedure vital signs reviewed and stable  Report to RN Given: handoff report given  Patient transferred to: PACU    Handoff Report: Identifed the Patient, Identified the Reponsible Provider, Reviewed the pertinent medical history, Discussed the surgical course, Reviewed Intra-OP anesthesia mangement and issues during anesthesia, Set expectations for post-procedure period and Allowed opportunity for questions and acknowledgement of understanding      Vitals:  Vitals Value Taken Time   /67 05/01/23 1303   Temp 100.3 F 05/01/23  3013   Pulse 79 05/01/23 1302   Resp 15 05/01/23  1303   SpO2 100 % 05/01/23 1302   Vitals shown include unvalidated device data.    Electronically Signed By: ZAINAB Crews CRNA  May 1, 2023  1:04 PM

## 2023-05-01 NOTE — ANESTHESIA POSTPROCEDURE EVALUATION
Patient: Shameka Maurice    Procedure: Procedure(s):  Danielle junaid Lis Panniculectomy with vertical component       Anesthesia Type:  General    Note:  Disposition: Outpatient   Postop Pain Control: Uneventful            Sign Out: Well controlled pain   PONV: No   Neuro/Psych: Uneventful            Sign Out: Acceptable/Baseline neuro status   Airway/Respiratory: Uneventful            Sign Out: Acceptable/Baseline resp. status   CV/Hemodynamics: Uneventful            Sign Out: Acceptable CV status; No obvious hypovolemia; No obvious fluid overload   Other NRE: NONE   DID A NON-ROUTINE EVENT OCCUR? No           Last vitals:  Vitals Value Taken Time   /77 05/01/23 1330   Temp 37.9  C (100.3  F) 05/01/23 1302   Pulse 73 05/01/23 1330   Resp 13 05/01/23 1330   SpO2 99 % 05/01/23 1330   Vitals shown include unvalidated device data.    Electronically Signed By: Isela Tovar MD  May 1, 2023  1:32 PM

## 2023-05-01 NOTE — INTERVAL H&P NOTE
I have reviewed the surgical (or preoperative) H&P that is linked to this encounter, and examined the patient. There are no significant changes    Clinical Conditions Present on Arrival:  Clinically Significant Risk Factors Present on Admission                     Corbin Scott MD , FACS   Diplomate American Board of Plastic Surgery  Diplomate American Board of Surgery  Adj. Assistant Professor of Surgery  Division of Plastic & Reconstructive Surgery   [facility]  Office: [phone]   5/1/2023 at 6:44 AM

## 2023-05-01 NOTE — OR NURSING
Assisted pt. To the bathroom, pt. Becomes tearful, begins to tremble and c/o pain 10/10 on pain scale, pt. Able to void without difficulty, call placed to Dr. Scott regarding pt. Pain control, Dr. Scott states to admit pt. Overnight, Charge RN notified

## 2023-05-01 NOTE — ANESTHESIA PROCEDURE NOTES
Airway       Patient location during procedure: OR       Procedure Start/Stop Times: 5/1/2023 7:38 AM  Staff -        Anesthesiologist:  Isela Tovar MD       CRNA: Annie Boyd APRN CRNA       Performed By: CRNA  Consent for Airway        Urgency: elective  Indications and Patient Condition       Indications for airway management: agapito-procedural       Induction type:intravenous       Mask difficulty assessment: 1 - vent by mask    Final Airway Details       Final airway type: endotracheal airway       Successful airway: ETT - single  Endotracheal Airway Details        ETT size (mm): 7.0       Cuffed: yes       Successful intubation technique: direct laryngoscopy       DL Blade Type: Reynolds 2       Grade View of Cords: 1       Adjucts: stylet       Position: Right       Measured from: gums/teeth       Secured at (cm): 19       Bite block used: None    Post intubation assessment        Placement verified by: capnometry, equal breath sounds and chest rise        Number of attempts at approach: 1       Secured with: silk tape       Ease of procedure: easy       Dentition: Intact and Unchanged       Dental guard used and removed. Dental Guard Type: Proguard Red.    Medication(s) Administered   Medication Administration Time: 5/1/2023 7:38 AM

## 2023-05-02 VITALS
TEMPERATURE: 97.9 F | DIASTOLIC BLOOD PRESSURE: 56 MMHG | SYSTOLIC BLOOD PRESSURE: 110 MMHG | RESPIRATION RATE: 19 BRPM | BODY MASS INDEX: 27.33 KG/M2 | WEIGHT: 169.3 LBS | HEART RATE: 60 BPM | OXYGEN SATURATION: 100 %

## 2023-05-02 LAB
GLUCOSE BLDC GLUCOMTR-MCNC: 88 MG/DL (ref 70–99)
HOLD SPECIMEN: NORMAL
PATH REPORT.COMMENTS IMP SPEC: NORMAL
PATH REPORT.COMMENTS IMP SPEC: NORMAL
PATH REPORT.FINAL DX SPEC: NORMAL
PATH REPORT.GROSS SPEC: NORMAL
PATH REPORT.MICROSCOPIC SPEC OTHER STN: NORMAL
PATH REPORT.RELEVANT HX SPEC: NORMAL
PHOTO IMAGE: NORMAL
PLATELET # BLD AUTO: 222 10E3/UL (ref 150–450)

## 2023-05-02 PROCEDURE — 250N000011 HC RX IP 250 OP 636: Performed by: PLASTIC SURGERY

## 2023-05-02 PROCEDURE — 82962 GLUCOSE BLOOD TEST: CPT

## 2023-05-02 PROCEDURE — 250N000013 HC RX MED GY IP 250 OP 250 PS 637: Performed by: PLASTIC SURGERY

## 2023-05-02 PROCEDURE — 36415 COLL VENOUS BLD VENIPUNCTURE: CPT | Performed by: PLASTIC SURGERY

## 2023-05-02 PROCEDURE — 96372 THER/PROPH/DIAG INJ SC/IM: CPT | Performed by: PLASTIC SURGERY

## 2023-05-02 PROCEDURE — 85049 AUTOMATED PLATELET COUNT: CPT | Performed by: PLASTIC SURGERY

## 2023-05-02 RX ADMIN — ENOXAPARIN SODIUM 40 MG: 40 INJECTION SUBCUTANEOUS at 13:01

## 2023-05-02 RX ADMIN — ACETAMINOPHEN 975 MG: 325 TABLET ORAL at 09:30

## 2023-05-02 RX ADMIN — HYDROMORPHONE HYDROCHLORIDE 2 MG: 2 TABLET ORAL at 06:35

## 2023-05-02 RX ADMIN — HYDROMORPHONE HYDROCHLORIDE 4 MG: 4 TABLET ORAL at 09:30

## 2023-05-02 RX ADMIN — HYDROMORPHONE HYDROCHLORIDE 4 MG: 4 TABLET ORAL at 17:22

## 2023-05-02 RX ADMIN — ONDANSETRON 4 MG: 4 TABLET, ORALLY DISINTEGRATING ORAL at 14:39

## 2023-05-02 RX ADMIN — HYDROMORPHONE HYDROCHLORIDE 0.4 MG: 0.2 INJECTION, SOLUTION INTRAMUSCULAR; INTRAVENOUS; SUBCUTANEOUS at 15:58

## 2023-05-02 RX ADMIN — HYDROMORPHONE HYDROCHLORIDE 2 MG: 2 TABLET ORAL at 00:32

## 2023-05-02 RX ADMIN — POLYETHYLENE GLYCOL 3350 17 G: 17 POWDER, FOR SOLUTION ORAL at 09:31

## 2023-05-02 RX ADMIN — HYDROMORPHONE HYDROCHLORIDE 4 MG: 4 TABLET ORAL at 13:01

## 2023-05-02 RX ADMIN — GABAPENTIN 100 MG: 100 CAPSULE ORAL at 13:01

## 2023-05-02 RX ADMIN — HYDROMORPHONE HYDROCHLORIDE 0.2 MG: 0.2 INJECTION, SOLUTION INTRAMUSCULAR; INTRAVENOUS; SUBCUTANEOUS at 04:11

## 2023-05-02 RX ADMIN — DOCUSATE SODIUM 50 MG AND SENNOSIDES 8.6 MG 1 TABLET: 8.6; 5 TABLET, FILM COATED ORAL at 09:30

## 2023-05-02 RX ADMIN — GABAPENTIN 100 MG: 100 CAPSULE ORAL at 09:30

## 2023-05-02 RX ADMIN — CEFAZOLIN 1 G: 1 INJECTION, POWDER, FOR SOLUTION INTRAMUSCULAR; INTRAVENOUS at 02:26

## 2023-05-02 RX ADMIN — ACETAMINOPHEN 975 MG: 325 TABLET ORAL at 04:13

## 2023-05-02 ASSESSMENT — ACTIVITIES OF DAILY LIVING (ADL)
ADLS_ACUITY_SCORE: 20
ADLS_ACUITY_SCORE: 21
ADLS_ACUITY_SCORE: 24
ADLS_ACUITY_SCORE: 20
ADLS_ACUITY_SCORE: 24
ADLS_ACUITY_SCORE: 21
ADLS_ACUITY_SCORE: 20

## 2023-05-02 NOTE — PHARMACY-ADMISSION MEDICATION HISTORY
Pharmacist Admission Medication History    Admission medication history is complete. The information provided in this note is only as accurate as the sources available at the time of the update.    Medication reconciliation/reorder completed by provider prior to medication history? Yes    Information Source(s): Patient and CareEverywhere/SureScripts via in-person    Pertinent Information: Post op orders from AVS have been pulled into the list below. Pt was not taking Keflex, Norco, Zofran or senna-docusate prior to admission.   Pt reports she is ok with skipping the Xulane patch while admitted.    Changes made to PTA medication list:    Added: None    Deleted: None    Changed: Timing of Adderall dosing, Flexeril to prn, fluconazole to prn      Allergies reviewed with patient and updates made in EHR: no    Medication History Completed By: Michelle Zepeda PharmD 5/2/2023 8:45 AM    Prior to Admission medications    Medication Sig Last Dose Taking? Auth Provider Long Term End Date   ADDERALL XR 20 MG 24 hr capsule Take 1 capsule by mouth every morning 4/30/2023 at am Yes Reported, Patient     amphetamine-dextroamphetamine (ADDERALL) 10 MG tablet Take 1 tablet by mouth daily at 4pm 4/30/2023 at pm Yes Reported, Patient     cephALEXin (KEFLEX) 500 MG capsule Take 1 capsule (500 mg) by mouth 3 times daily for 7 days  Yes Corbin Scott MD  5/8/23   citalopram (CELEXA) 20 MG tablet Take 20 mg by mouth daily 4/30/2023 Yes Reported, Patient No    cyanocobalamin (VITAMIN B-12) 1000 MCG SUBL sublingual tablet Place 1 tablet under the tongue daily 4/30/2023 Yes Reported, Patient     cyclobenzaprine (FLEXERIL) 10 MG tablet Take 10 mg by mouth daily as needed 4/27/2023 Yes Reported, Patient No    fluconazole (DIFLUCAN) 150 MG tablet Take 150 mg by mouth once as needed (yeast infection due to Flagyl gel) 4/25/2023 Yes Reported, Patient     HYDROcodone-acetaminophen (NORCO) 5-325 MG tablet Take 1-2 tablets by mouth every 4 hours  as needed for moderate to severe pain  Yes Corbin Scott MD     LORazepam (ATIVAN) 0.5 MG tablet Take 0.5 mg by mouth 5/1/2023 Yes Reported, Patient No    metroNIDAZOLE (METROGEL) 0.75 % vaginal gel once a week 4/30/2023 Yes Reported, Patient     ondansetron (ZOFRAN ODT) 4 MG ODT tab Take 1 tablet (4 mg) by mouth every 8 hours as needed for nausea  Yes Corbin Scott MD     senna-docusate (SENOKOT-S/PERICOLACE) 8.6-50 MG tablet Take 1-2 tablets by mouth 2 times daily  Yes Corbin Scott MD     vitamin D3 (CHOLECALCIFEROL) 50 mcg (2000 units) tablet Take 1 tablet by mouth daily 4/30/2023 Yes Reported, Patient     norelgestromin-ethinyl estradiol (XULANE) 150-35 MCG/24HR patch Place 1 patch onto the skin once a week 4/28/2023 at removed Friday  Reported, Patient

## 2023-05-02 NOTE — PROGRESS NOTES
Discharge Summary    Patient: Shameka Maurice  MR: [id number]  Date of Admission: 5/1/2023  Date of Discharge: May 2, 2023  Attending: Corbin Scott MD, FACS  Procedures: Xdvrg-ln-rqb's panniculectomy with vertical component  Consults: None  Clinical Course: Shameka Maurice is a 37 year old lady who was admitted s/p [id number]-dq-dwj's panniculectomy with vertical component.  She was admitted for pain control.  She had an uneventful night and she is discharged home 24 hours later with follow-up with me in the morning.    Physical findings on discharge:  No acute distress.  Dressings are intact.  No evidence of hemorrhage.    Discharge Instructions:  Activity: Ad clarita.  Dressings: Antibiotic ointment over the incision, abdominal binders.  Drains: Tk drain right lower quadrant.  Restrictions: No heavy lifting, nothing heavier than 10 pounds for the next 6 weeks.    Discharge Medications:     Medication List      Started    cephALEXin 500 MG capsule  Commonly known as: KEFLEX  500 mg, Oral, 3 TIMES DAILY     HYDROcodone-acetaminophen 5-325 MG tablet  Commonly known as: NORCO  1-2 tablets, Oral, EVERY 4 HOURS PRN     ondansetron 4 MG ODT tab  Commonly known as: ZOFRAN ODT  4 mg, Oral, EVERY 8 HOURS PRN     senna-docusate 8.6-50 MG tablet  Commonly known as: SENOKOT-S/PERICOLACE  1-2 tablets, Oral, 2 TIMES DAILY            Follow-up:  Patient should follow up with Dr. Scott, May 3 at the plastic surgery clinic, [facility].  Call the clinic at [phone] or plastic surgery nurse at [phone] if any questions.    Corbin Scott MD , FACS   Diplomate American Board of Plastic Surgery  Diplomate American Board of Surgery  Adj. Assistant Professor of Surgery  Division of Plastic & Reconstructive Surgery   [facility]  Office: [phone]   5/2/2023 at 5:14 PM

## 2023-05-02 NOTE — PLAN OF CARE
Goal Outcome Evaluation:             Patient discharged. All questions answered and all paperwork signed. Patient confirmed all meds waiting at pharmacy to be picked up. Patient wheeled to front door by staff and helped into family car.

## 2023-05-02 NOTE — DISCHARGE SUMMARY
Discharge Summary     Patient: Shameka Maurice  MR: [id number]  Date of Admission: 5/1/2023  Date of Discharge: May 2, 2023  Attending: Corbin Scott MD, FACS  Procedures: Vrcug-se-ckb's panniculectomy with vertical component  Consults: None  Clinical Course: Shameka Maurice is a 37 year old lady who was admitted s/p mcpur-zo-bop's panniculectomy with vertical component.  She was admitted for pain control.  She had an uneventful night and she is discharged home 24 hours later with follow-up with me in the morning.     Physical findings on discharge:  No acute distress.  Dressings are intact.  No evidence of hemorrhage.     Discharge Instructions:  Activity: Ad clarita.  Dressings: Antibiotic ointment over the incision, abdominal binders.  Drains: Tk drain right lower quadrant.  Restrictions: No heavy lifting, nothing heavier than 10 pounds for the next 6 weeks.     Discharge Medications:      Medication List       Started    cephALEXin 500 MG capsule  Commonly known as: KEFLEX  500 mg, Oral, 3 TIMES DAILY      HYDROcodone-acetaminophen 5-325 MG tablet  Commonly known as: NORCO  1-2 tablets, Oral, EVERY 4 HOURS PRN      ondansetron 4 MG ODT tab  Commonly known as: ZOFRAN ODT  4 mg, Oral, EVERY 8 HOURS PRN      senna-docusate 8.6-50 MG tablet  Commonly known as: SENOKOT-S/PERICOLACE  1-2 tablets, Oral, 2 TIMES DAILY                Follow-up:  Patient should follow up with Dr. cSott, May 3 at the plastic surgery clinic, [facility].  Call the clinic at [phone] or plastic surgery nurse at [phone] if any questions.      Corbin Scott MD , FACS   Diplomate American Board of Plastic Surgery  Diplomate American Board of Surgery  Adj. Assistant Professor of Surgery  Division of Plastic & Reconstructive Surgery   [facility]  Office: [phone]   5/2/2023 at 5:25 PM

## 2023-05-02 NOTE — PLAN OF CARE
Goal Outcome Evaluation:      Plan of Care Reviewed With: patient    Overall Patient Progress: improvingOverall Patient Progress: improving     Patient reports 10/10 pain upon arriving to unit, describing it as burning at the site of the J/P drain. Gave PRN Dilaudid 0.4 mg IV at 1830 and gave PRN Dilaudid 0.4 mg PO at 2030. Patient reports reduced pain throughout shift. Patient tolerated clear liquids, advanced patient to full liquids. Tolerated well.

## 2023-05-02 NOTE — PLAN OF CARE
Problem: Plan of Care - These are the overarching goals to be used throughout the patient stay.    Goal: Plan of Care Review  Description: The Plan of Care Review/Shift note should be completed every shift.  The Outcome Evaluation is a brief statement about your assessment that the patient is improving, declining, or no change.  This information will be displayed automatically on your shift note.  Outcome: Progressing   Goal Outcome Evaluation:    Receiving 4mg PO dilaudid for pain, along w/ scheduled pain medications. Rating pain 4-8/10. Abdominal binder in place. Abdominal dressing CDI. ALF drain in place. Voiding adequately. Ambulated in soriano. Advanced to regular diet, tolerating. VSS on RA.

## 2023-05-02 NOTE — BRIEF OP NOTE
[facility]    Brief Operative Note    Pre-operative diagnosis: Abdominal pannus [E65]  Intertrigo [L30.4]  Post-operative diagnosis Same as pre-operative diagnosis    Procedure: Procedure(s):  Danielle junaid Lis Panniculectomy with vertical component  Surgeon: Surgeon(s) and Role:     * Corbin Scott MD - Primary  Anesthesia: General   Estimated Blood Loss: 5 mL from 5/1/2023  7:28 AM to 5/1/2023  1:01 PM      Drains: Edinson-Patel  Specimens:   ID Type Source Tests Collected by Time Destination   1 : Abdominal Wall Pannus Tissue Abdomen SURGICAL PATHOLOGY EXAM Corbin Scott MD 5/1/2023  9:26 AM      Findings:   None.  Complications: None.  Implants: * No implants in log *      Corbin Scott MD , FACS   Diplomate American Board of Plastic Surgery  Diplomate American Board of Surgery  Adj. Assistant Professor of Surgery  Division of Plastic & Reconstructive Surgery   [facility]  Office: [phone]   5/1/2023 at 7:29 PM

## 2023-05-02 NOTE — OP NOTE
May 1, 2023    Shameka Maurice      Preoperative diagnosis: Large abdominal wall pannus, recurrent episodes of intertrigo, excessive skin laxity supraumbilical region     Postoperative diagnosis: same     Procedure: Ndldy-zg-aul's panniculectomy      Surgeon: Corbin Scott MD.     Assistant: Lucy Sparks CSA (there was no plastic surgery resident available to assist).     Anesthesia: General     IV fluids:  2000 cc    Tumescent solution: 800 mL of lactated Ringer's mixed with 1 mg of epinephrine (1 mg of epinephrine per 1000 mL of lactated Ringer's) in the abdominal wall pannus, midline and epigastric region    Estimated blood loss (EBL): 5 cc    Urine output: 150 cc     Findings: Large abdominal wall pannus that was hanging over the pubic region.  Significant epigastric skin laxity with lipodystrophy.  Diastases recti.     Specimen: Abdominal wall pannus and vertical epigastric loose skin     Drains: one # 19 Singaporean Tk drain on the right lower quadrant.     Disposition: Patient tolerated procedure well, she was extubated and transferred to recovery room awake and in stable condition.     Indications:    Shameka Maurice is a 37 year old patient with history of bariatric surgery with subsequent significant weight loss.  The weight loss has now been stable for more than 18 months.  After this significant weight loss, the patient has now developed a great amount of abdominal wall skin laxity, especially a hanging abdominal wall pannus over the pubic region as well as significant skin laxity on the supra umbilical and epigastric region.  The friction of the hanging abdominal wall pannus over the suprapubic abdominal crease, is causing multiple, chronic and recurrent episode of intertrigo that have failed medical therapy.    Patient has then been offered a Dgenk-ym-lzx's panniculectomy in order to treat the infraumbilical hanging abdominal wall pannus, that is causing the recurrent episode of  "intertrigo in the pubic region, as well as the significant supraumbilical and epigastric skin laxity.    Risks of the surgery has been explained to the patient and they include but are not limited to scarring, both in the suprapubic region as well as along the midline (forming an inverted \"T\"), wound dehiscence, wound healing problems (especially at the level of the intersection of the vertical component of the incision with the horizontal component of the incision), infection, wound ischemia and necrosis, bleeding, hematoma, seroma, loss of the umbilicus, contour irregularities, venous thromboembolism (VTE) and pulmonary embolism (PE), need for further surgeries.  Patient has acknowledged all these risks and has signed informed consent agreeing to proceed.     Procedure:     Patient was identified in the preoperative holding area and preoperative IV antibiotics were given.    I also proceeded to perform preoperative markings on the anterior abdominal wall.  First, I draw the midline from the xiphoid process down to the vulvar cleft.  I stopped 7 cm superior from the vulvar cleft.  Then, I proceeded to perform, via Pinch test technique, the vertical markings of the area to be excised in the epigastric region.  14 cm were found to be adequate to safely excise on the epigastric region, 7 cm from each size of the midline.  These marking were drawn down up to the level of the umbilicus.    Then, I proceeded to perform the inferior markings along the inguinal crease bilaterally.  These markings intersected at the level of the midline and in the pubic region with the lit that was 7 cm superior from the vulvar cleft.  The markings were extended laterally and superiorly in the direction of the anterior iliac superior spine (AISP).  From the AISP I draw a second and transverse curve line towards the umbilicus that intersected with the vertical markings that were coming down from the epigastric region and that were parallel " from the midline.  At this time, I also performed a Pinch test in order to make sure that this infra abdominal flap would be able to be closed without undue tension in the lower abdomen.    This is how the markings looked like:                        Patient was then brought to the operating room and was placed supine on the operating room table.  Patient already had sequential compression devices on both lower extremities and they were immediately activated.  After general anesthesia was obtained, a Cummings catheter was introduced and the chest, abdomen, flanks, pubic region and upper thighs were then prepped and draped in sterile surgical fashion.     Utilizing a 2 mm Klein's infiltrating cannula, we proceeded to infiltrate the subcutaneous tissues of the marked pannus areas to be resected with tumescent solution. This solution was beneficial for hemostasis and to facilitate dissection.     Then, we proceeded to make incision along the inferior border of the marked pannus area along the inguinal crease and dissection was taken down with electrocautery to the Rectus Abdominis fascia. Dissection of the abdominal wall flap continued superiorly and towards the umbilicus and midline.      Once we reach the umbilicus we divided the inferior abdominal wall flap along its midline and detach the umbilicus with a scalpel #15 from this surrounding abdominal wall flap.  The umbilicus along its stalk remained attached to its opening on the rectus abdominis fascia.  At this time, we stop the dissection towards the epigastric area and proceed to reassess the markings of the upper border of this inferior abdominal wall flap.      We sat down the patient at approximately 45 degree angles and proceeded to pull down inferiorly the dissected inferior abdominal wall flap and marked the inferior edges of this flap over the inferior incision of the panniculectomy along the inguinal creases.  These new markings coincided with the  preoperative upper transverse markings.  After confirming that the flap could be closed without excessive tension, the abdominal wall flap was then divided over these markings.      This flap represented the inferior hanging abdominal wall pannus.  Said flap was sent to pathology as a specimen.     Then, we continue with the supraumbilical and epigastric dissection until we reach the xiphoid process.  At this time we confirm again the preoperative markings with a new Pinch test on this supraumbilical flap.  We were satisfied that the area will be closed without undue tension and this flap was then divided along its lateral markings and it was completely excised and sent to pathology as a specimen.    The total weight of the excised epigastric and infraumbilical pannus was: 2997 grams.    We inspected the integrity of the fascia of the rectus abdominis muscles along the midline and there was evidence of diastases recti. With methylene blue we marked the diastases recti approximately 3 cm laterally from each side of the midline.  Essentially, the width of the diastases recti was approximately 6 cm.  Utilizing 1-0 Prolene we proceeded to close this diastases with running intraaponeurotic stitches from the xiphoid down to umbilicus and from the umbilicus down to the symphysis pubis.    Irrigation was provided and we found that hemostasis was excellent.  A #19 English Tk drain was placed on the right lower quadrant.  This was secured to the skin with 2-0 silk.    At this time, the supraumbilical/epigastric opening was temporarily approximated towards the midline with staples. This closure showed that there was no significant tension along the midline and no undermining was performed.  We then proceeded to approximate the inferior opening of the panniculectomy along the inguinal region to this upper abdominal flap.  This temporary closure was also performed with staples, beginning laterally from each anterior iliac  superior spine (AISP) towards the midline in order to decrease any tension on the midline.  Finally, when we reach the midline, we were satisfied that we wear able to approximate the vertical incision of the upper flap along its midline to the corresponding midline of the inferior opening of the panniculectomy without any tension.  Lack of tension in this intersection of the vertical incision with horizontal incision is desirable in order to prevent ischemia, necrosis and wound healing problems.    The new umbilical opening along the midline/vertical incision on the upper abdominal wall flap was determined by utilizing the intersection of this midline and the superior edge of bilateral iliac crests.  The umbilicus was secured to the surrounding subcutaneous tissues with 3-0 Monocryl buried interrupted stitches followed by 5-0 Monocryl running subcuticular stitches.    The abdominal flaps were anchored to the fascia of the rectus abdominal muscle with multiple quilting sutures utilizing 2-0 Vicryl interrupted stitches.    Both the midline and inferior panniculectomy incisions were closed in layers with 2-0 Vicryl buried interrupted stitches at the level of the deep fascia, followed by 3-0 Monocryl interrupted stitches at the level of the subcutaneous tissue and then the skin with 4-0 Stratafix running subcuticular stitches.    Instrument count was reported by nursing personnel as correct, patient tolerated procedure well, she was then extubated and transferred to recovery room awake and in stable condition.      Corbin Scott MD , FACS   Diplomate American Board of Plastic Surgery  Diplomate American Board of Surgery  Adj. Assistant Professor of Surgery  Division of Plastic & Reconstructive Surgery   [facility]  Office: [phone]   5/1/2023 at 7:51 PM

## 2023-05-02 NOTE — PROGRESS NOTES
Ms. Myles is a 36 years old lady status post wlorz-qg-bsd's panniculectomy.  She is 1 day out from surgery.  She was admitted overnight for pain control.  No events overnight.  Patient is feeling well ready to go home.    At the physical exam, dressings are intact, Tk drain in the right lower quadrant with serosanguineous fluid.  No active bleeding.    Plan: Patient will be discharged home with follow-up with me tomorrow.  She may begin having regular showers starting tomorrow.  Patient is doing well from plastic surgery standpoint.    Corbin Scott MD , FACS   Diplomate American Board of Plastic Surgery  Diplomate American Board of Surgery  Adj. Assistant Professor of Surgery  Division of Plastic & Reconstructive Surgery   [facility]  Office: [phone]   5/2/2023 at 5:14 PM

## 2023-05-02 NOTE — PLAN OF CARE
Problem: Plan of Care - These are the overarching goals to be used throughout the patient stay.    Goal: Absence of Hospital-Acquired Illness or Injury  Intervention: Prevent Skin Injury  Recent Flowsheet Documentation  Taken 5/2/2023 0000 by Regina Llanes RN  Body Position: position changed independently   Goal Outcome Evaluation: patient ambulating to bathroom and back well, patient doing better with pain being well controlled on tylenol and dilaudid. For morning rounds pain meds should be addressed as to if they want to give her oral dilaudid for home, if not transitioning her to something she can take home would be best. ALF drain doing well. No acute events overnight

## 2023-05-03 ENCOUNTER — OFFICE VISIT (OUTPATIENT)
Dept: PLASTIC SURGERY | Facility: AMBULATORY SURGERY CENTER | Age: 38
End: 2023-05-03
Payer: COMMERCIAL

## 2023-05-03 DIAGNOSIS — Z98.890 STATUS POST PANNICULECTOMY: Primary | ICD-10-CM

## 2023-05-03 PROCEDURE — 99024 POSTOP FOLLOW-UP VISIT: CPT | Performed by: PLASTIC SURGERY

## 2023-05-03 NOTE — LETTER
5/3/2023         RE: Shameka Maurice  [address]  [place] [facility] [address]        Dear Colleague,    Thank you for referring your patient, Shameka Maurice, to the [facility] PLASTIC SURGERY CLINIC [place]. Please see a copy of my visit note below.    Shameka is a 36 years old patient status post izocp-qp-qxz's panniculectomy.  She is 2 days out from surgery.    At the physical exam, all incisions are healing well, no sign of infection or dehiscence.  There is no evidence of ischemia along the incisions.  Tk drains draining serosanguineous fluid.    Plan: Continue with abdominal binders, patient may have regular showers, follow-up with me in 1 week for evaluation for possible drain removal.  Patient is doing well from plastic surgery standpoint.    Corbin Scott MD , FACS   Diplomate American Board of Plastic Surgery  Diplomate American Board of Surgery  Adj. Assistant Professor of Surgery  Division of Plastic & Reconstructive Surgery   [facility]  Office: [phone]   5/3/2023 at 1:40 PM         Again, thank you for allowing me to participate in the care of your patient.        Sincerely,        Corbin Scott MD

## 2023-05-03 NOTE — PROGRESS NOTES
Shameka is a 36 years old patient status post oxwnq-gh-asa's panniculectomy.  She is 2 days out from surgery.    At the physical exam, all incisions are healing well, no sign of infection or dehiscence.  There is no evidence of ischemia along the incisions.  Tk drains draining serosanguineous fluid.    Plan: Continue with abdominal binders, patient may have regular showers, follow-up with me in 1 week for evaluation for possible drain removal.  Patient is doing well from plastic surgery standpoint.    Corbin Scott MD , FACS   Diplomate American Board of Plastic Surgery  Diplomate American Board of Surgery  Adj. Assistant Professor of Surgery  Division of Plastic & Reconstructive Surgery   [facility]  Office: [phone]   5/3/2023 at 1:40 PM

## 2023-05-08 RX ORDER — DOCUSATE SODIUM 100 MG/1
100 CAPSULE, LIQUID FILLED ORAL 2 TIMES DAILY
Qty: 20 CAPSULE | Refills: 0 | Status: SHIPPED | OUTPATIENT
Start: 2023-05-08

## 2023-05-08 RX ORDER — OXYCODONE AND ACETAMINOPHEN 5; 325 MG/1; MG/1
1 TABLET ORAL EVERY 6 HOURS PRN
Qty: 12 TABLET | Refills: 0 | Status: SHIPPED | OUTPATIENT
Start: 2023-05-08 | End: 2023-05-11

## 2023-05-09 ENCOUNTER — MYC MEDICAL ADVICE (OUTPATIENT)
Dept: PLASTIC SURGERY | Facility: AMBULATORY SURGERY CENTER | Age: 38
End: 2023-05-09
Payer: COMMERCIAL

## 2023-05-09 NOTE — TELEPHONE ENCOUNTER
----- Message from Katey Boudreaux sent at 5/9/2023 10:09 AM CDT -----  Patient sent a mychart msg about getting a different pain med and said that Dr Scott prescribed Oxy but today the alert that she got from the pharmacy was just for the Colace so she's not sure if he sent the oxy. She can be reached at [phone]    Thanks

## 2023-05-09 NOTE — TELEPHONE ENCOUNTER
I called [facility] and spoke with the Pharmacist. The Percocet Rx was under review due to the pt having Ativan on her med list. Informed Pharmacist that I would discuss the importance of not taking these medications together. Rx will be filled today. Pt aware of this and we discussed safe use of taking the Oxycodone. She verbalized understanding and had no other concerns at this time. She will follow up with Dr. Scott for a post-op visit on Thursday.

## 2023-05-09 NOTE — PROGRESS NOTES
Patient is a status post manas's panniculectomy 8 days ago.  She is requesting more pain medications.  I have prescribed 12 pills of Percocet.  No more refills after this.    Corbin Scott MD , FACS   Diplomate American Board of Plastic Surgery  Diplomate American Board of Surgery  Adj. Assistant Professor of Surgery  Division of Plastic & Reconstructive Surgery   [facility]  Office: [phone]   5/8/2023 at 8:35 PM

## 2023-05-10 ENCOUNTER — TELEPHONE (OUTPATIENT)
Dept: PLASTIC SURGERY | Facility: CLINIC | Age: 38
End: 2023-05-10
Payer: COMMERCIAL

## 2023-05-10 NOTE — TELEPHONE ENCOUNTER
Call placed to patient to notify her that prescription for narcotic and stool softener were sent to her pharmacy per her request. The patient was informed via voicemail that this is the final pain med prescription Dr. Scott will be able to provide. I have encouraged the patient to try Ibuprofen over the counter as well to help bridge the gap in between doses of percocet. Patient encouraged to call back with any questions or concerns.         Gisselle Locke RN on 5/10/2023 at 8:01 AM

## 2023-05-11 ENCOUNTER — OFFICE VISIT (OUTPATIENT)
Dept: PLASTIC SURGERY | Facility: AMBULATORY SURGERY CENTER | Age: 38
End: 2023-05-11
Payer: COMMERCIAL

## 2023-05-11 DIAGNOSIS — Z98.890 STATUS POST PANNICULECTOMY: Primary | ICD-10-CM

## 2023-05-11 PROCEDURE — 99024 POSTOP FOLLOW-UP VISIT: CPT | Performed by: PLASTIC SURGERY

## 2023-05-11 NOTE — PROGRESS NOTES
Shameka is a 37 years old lady status post cqwbw-li-zhb's panniculectomy.  She is 11 days out from surgery.  She is doing well.    At the physical exam, all incisions are healing well.  No sign of infection or dehiscence.  Tk drain in the right lower quadrant still with an output greater than 30 cc a day.    Plan: Continue with compression garments, continue with Tk drain and follow-up with me next Wednesday for possible drain removal.      Corbin Scott MD , FACS   Diplomate American Board of Plastic Surgery  Diplomate American Board of Surgery  Adj. Assistant Professor of Surgery  Division of Plastic & Reconstructive Surgery   [facility]  Office: [phone]   5/11/2023 at 2:54 PM

## 2023-05-11 NOTE — LETTER
5/11/2023         RE: Shameka Maurice  [address]  [place] [address]        Dear Colleague,    Thank you for referring your patient, Shameka Maurice, to the [facility] PLASTIC SURGERY CLINIC [place]. Please see a copy of my visit note below.    Shameka is a 37 years old lady status post epaaz-qo-yep's panniculectomy.  She is 11 days out from surgery.  She is doing well.    At the physical exam, all incisions are healing well.  No sign of infection or dehiscence.  Tk drain in the right lower quadrant still with an output greater than 30 cc a day.    Plan: Continue with compression garments, continue with Tk drain and follow-up with me next Wednesday for possible drain removal.      Corbin Scott MD , FACS   Diplomate American Board of Plastic Surgery  Diplomate American Board of Surgery  Adj. Assistant Professor of Surgery  Division of Plastic & Reconstructive Surgery   [facility]  Office: [phone]   5/11/2023 at 2:54 PM           Again, thank you for allowing me to participate in the care of your patient.        Sincerely,        Corbin Scott MD

## 2023-05-19 ENCOUNTER — OFFICE VISIT (OUTPATIENT)
Dept: PLASTIC SURGERY | Facility: AMBULATORY SURGERY CENTER | Age: 38
End: 2023-05-19
Payer: COMMERCIAL

## 2023-05-19 DIAGNOSIS — Z98.890 STATUS POST PANNICULECTOMY: Primary | ICD-10-CM

## 2023-05-19 PROCEDURE — 99024 POSTOP FOLLOW-UP VISIT: CPT | Performed by: PLASTIC SURGERY

## 2023-05-19 NOTE — PROGRESS NOTES
Shameka is a 37 years old lady status post ynnkr-al-fji's panniculectomy.  She is 19 days out from surgery.  She is doing well.    At the physical exam, all incisions are healing well.  No sign of infection or dehiscence.  Tk drain in the right lower quadrant still with an output greater than 30 cc a day.    Plan: Continue with compression garments, continue with Tk drain and follow-up with me next Wednesday for possible drain removal.    Corbin Scott MD , FACS   Diplomate American Board of Plastic Surgery  Diplomate American Board of Surgery  Adj. Assistant Professor of Surgery  Division of Plastic & Reconstructive Surgery   [facility]  Office: [phone]   5/19/2023 at 2:20 PM

## 2023-05-19 NOTE — LETTER
5/19/2023         RE: Shameka Maurice  [address]  [place] [address]        Dear Colleague,    Thank you for referring your patient, Shameka Maurice, to the [facility] PLASTIC SURGERY CLINIC [place]. Please see a copy of my visit note below.    Shameka is a 37 years old lady status post ezpbe-vm-esa's panniculectomy.  She is 19 days out from surgery.  She is doing well.    At the physical exam, all incisions are healing well.  No sign of infection or dehiscence.  Tk drain in the right lower quadrant still with an output greater than 30 cc a day.    Plan: Continue with compression garments, continue with Tk drain and follow-up with me next Wednesday for possible drain removal.    Corbin Scott MD , FACS   Diplomate American Board of Plastic Surgery  Diplomate American Board of Surgery  Adj. Assistant Professor of Surgery  Division of Plastic & Reconstructive Surgery   [facility]  Office: [phone]   5/19/2023 at 2:20 PM         Again, thank you for allowing me to participate in the care of your patient.        Sincerely,        Corbin Scott MD

## 2023-05-24 ENCOUNTER — OFFICE VISIT (OUTPATIENT)
Dept: PLASTIC SURGERY | Facility: AMBULATORY SURGERY CENTER | Age: 38
End: 2023-05-24
Payer: COMMERCIAL

## 2023-05-24 DIAGNOSIS — Z98.890 STATUS POST PANNICULECTOMY: Primary | ICD-10-CM

## 2023-05-24 PROCEDURE — 99024 POSTOP FOLLOW-UP VISIT: CPT | Performed by: PLASTIC SURGERY

## 2023-05-24 NOTE — LETTER
5/24/2023         RE: Shameka Maurice  [address]  [place] [address]        Dear Colleague,    Thank you for referring your patient, Shameka Maurice, to the [facility] PLASTIC SURGERY CLINIC [place]. Please see a copy of my visit note below.    Ms. Myles is the 37 years old lady status post wgblq-nj-tzg's panniculectomy.  She is 24 days out from surgery.  She is doing excellent.    At the physical exam, all incisions are well-healed.  Drain has been removed due to low output.    Plan: Continue compression garment, follow-up with me in 1 week for reevaluation.  Patient is doing well from plastic surgery standpoint.    Corbin Scott MD , FACS   Diplomate American Board of Plastic Surgery  Diplomate American Board of Surgery  Adj. Assistant Professor of Surgery  Division of Plastic & Reconstructive Surgery   [facility]  Office: [phone]   5/24/2023 at 2:04 PM         Again, thank you for allowing me to participate in the care of your patient.        Sincerely,        Corbin Scott MD

## 2023-05-24 NOTE — PROGRESS NOTES
Ms. Myles is the 37 years old lady status post cgbiy-pl-nyb's panniculectomy.  She is 24 days out from surgery.  She is doing excellent.    At the physical exam, all incisions are well-healed.  Drain has been removed due to low output.    Plan: Continue compression garment, follow-up with me in 1 week for reevaluation.  Patient is doing well from plastic surgery standpoint.    Corbin Scott MD , FACS   Diplomate American Board of Plastic Surgery  Diplomate American Board of Surgery  Adj. Assistant Professor of Surgery  Division of Plastic & Reconstructive Surgery   [facility]  Office: [phone]   5/24/2023 at 2:04 PM

## 2023-06-02 ENCOUNTER — OFFICE VISIT (OUTPATIENT)
Dept: PLASTIC SURGERY | Facility: AMBULATORY SURGERY CENTER | Age: 38
End: 2023-06-02
Payer: COMMERCIAL

## 2023-06-02 DIAGNOSIS — Z98.890 STATUS POST PANNICULECTOMY: Primary | ICD-10-CM

## 2023-06-02 PROCEDURE — 99207 PR NO CHARGE NURSE ONLY: CPT | Performed by: PLASTIC SURGERY

## 2023-06-02 NOTE — LETTER
6/2/2023         RE: Shameka Maurice  [address]  [place] [address]        Dear Colleague,    Thank you for referring your patient, Shameka Maurice, to the [facility] PLASTIC SURGERY CLINIC [place]. Please see a copy of my visit note below.    Patient here today S/P Danielle becerra panniculectomy on 05/01/2023. The patient is 1 month out from surgery and doing well.     The patient reports that she wears her compression garment everyday but I have encouraged her to wear it at bedtime as she started to remove it when sleeping. Additionally the patient garment was not small enough so she has ordered a new smaller size.     The patient is healing well and has no complaints or concerns.     The patient is scheduled to follow-up in 1 month.     Gisselle Locke RN on 6/2/2023 at 9:44 AM        Again, thank you for allowing me to participate in the care of your patient.        Sincerely,        Corbin Scott MD

## 2023-06-02 NOTE — PROGRESS NOTES
Patient here today S/P Danielle becerra panniculectomy on 05/01/2023. The patient is 1 month out from surgery and doing well.     The patient reports that she wears her compression garment everyday but I have encouraged her to wear it at bedtime as she started to remove it when sleeping. Additionally the patient garment was not small enough so she has ordered a new smaller size.     The patient is healing well and has no complaints or concerns.     The patient is scheduled to follow-up in 1 month.     Gisselle Locke RN on 6/2/2023 at 9:44 AM

## 2023-07-07 ENCOUNTER — OFFICE VISIT (OUTPATIENT)
Dept: PLASTIC SURGERY | Facility: AMBULATORY SURGERY CENTER | Age: 38
End: 2023-07-07
Payer: COMMERCIAL

## 2023-07-07 DIAGNOSIS — Z98.890 STATUS POST PANNICULECTOMY: Primary | ICD-10-CM

## 2023-07-07 PROCEDURE — 99024 POSTOP FOLLOW-UP VISIT: CPT | Performed by: PLASTIC SURGERY

## 2023-07-07 NOTE — PROGRESS NOTES
Shameka is a 36 years old lady status post jbddn-le-cqr's panniculectomy.  She is 2-month out from surgery.  She is feeling well.    At the physical exam, patient present with well-healed panniculectomy incisions, excellent definition and contour.  No hypertrophic scarring or keloid.                                      Plan: Continue compression garment for another month, continue with cocoa butter lotion and scar massage and follow-up with me as needed.  Patient is doing excellent from plastic surgery standpoint.  She is happy with results and so am I.    Corbin Scott MD , FACS   Diplomate American Board of Plastic Surgery  Diplomate American Board of Surgery  Adj. Assistant Professor of Surgery  Division of Plastic & Reconstructive Surgery   [facility]  Office: [phone]   7/7/2023 at 1:57 PM

## 2023-07-07 NOTE — LETTER
7/7/2023         RE: Shameka Maurice  [address]  [place] [address]        Dear Colleague,    Thank you for referring your patient, Shameka Maurice, to the [facility] PLASTIC SURGERY CLINIC [place]. Please see a copy of my visit note below.    Shameka is a 36 years old lady status post ngoye-ho-jeq's panniculectomy.  She is 2-month out from surgery.  She is feeling well.    At the physical exam, patient present with well-healed panniculectomy incisions, excellent definition and contour.  No hypertrophic scarring or keloid.                                      Plan: Continue compression garment for another month, continue with cocoa butter lotion and scar massage and follow-up with me as needed.  Patient is doing excellent from plastic surgery standpoint.  She is happy with results and so am I.    Corbin Scott MD , FACS   Diplomate American Board of Plastic Surgery  Diplomate American Board of Surgery  Adj. Assistant Professor of Surgery  Division of Plastic & Reconstructive Surgery   [facility]  Office: [phone]   7/7/2023 at 1:57 PM         Again, thank you for allowing me to participate in the care of your patient.        Sincerely,        Corbin Scott MD

## 2023-09-10 ENCOUNTER — HEALTH MAINTENANCE LETTER (OUTPATIENT)
Age: 38
End: 2023-09-10

## 2024-11-03 ENCOUNTER — HEALTH MAINTENANCE LETTER (OUTPATIENT)
Age: 39
End: 2024-11-03

## 2025-07-28 ENCOUNTER — APPOINTMENT (OUTPATIENT)
Dept: OCCUPATIONAL MEDICINE | Facility: CLINIC | Age: 40
End: 2025-07-28

## 2025-07-28 PROCEDURE — 99499 UNLISTED E&M SERVICE: CPT | Performed by: NURSE PRACTITIONER

## 2025-07-28 PROCEDURE — 97799 UNLISTED PHYSCL MED/REHAB PX: CPT | Performed by: NURSE PRACTITIONER

## (undated) DEVICE — SOL WATER IRRIG 1000ML BOTTLE 2F7114

## (undated) DEVICE — BINDER ABDOMINAL 3PANEL LG 45IN 08140345

## (undated) DEVICE — CATH TRAY FOLEY SURESTEP 16FR DRAIN BAG STATOCK A899916

## (undated) DEVICE — BLANKET BAIR HUGGER UNDERBODY 36X84 AU63500

## (undated) DEVICE — SUCTION MANIFOLD NEPTUNE 2 SYS 1 PORT 702-025-000

## (undated) DEVICE — DRSG ABD TNDRSRB WET PRUF 8IN X 10IN STRL  9194A

## (undated) DEVICE — SU VICRYL+ 3-0 TIES 18IN UND VCP110G

## (undated) DEVICE — MARKER SURG SKIN STRL 77734

## (undated) DEVICE — BLADE KNIFE SURG 10 371110

## (undated) DEVICE — GLOVE SURG PI ULTRA TOUCH M SZ 6-1/2 LF

## (undated) DEVICE — DRAIN BLAKE 19FR SIL 2231

## (undated) DEVICE — GOWN LG DISP 9515

## (undated) DEVICE — DRAIN RESERVOIR 100ML JP 0070740

## (undated) DEVICE — DRAPE SURGICAL BARIATRIC 92INW X 110INW X 132INL 94590

## (undated) DEVICE — CLEANER CAUTERY TIP V8101

## (undated) DEVICE — DECANTER BAG 2002S

## (undated) DEVICE — DRAPE IOBAN INCISE 23X17" 6650EZ

## (undated) DEVICE — DRSG KERLIX 4 1/2"X4YDS ROLL 6715

## (undated) DEVICE — DRAPE SHEET REV FOLD 3/4 9349

## (undated) DEVICE — SUTURE MONOCRYL+ 5-0 18IN P3 UND MCP493G

## (undated) DEVICE — SOL NACL 0.9% INJ 1000ML BAG 2B1324X

## (undated) DEVICE — SUTURE VICRYL+ 2-0 27IN CT-1 UND VCP259H

## (undated) DEVICE — SU MONOCRYL 3-0 PS-2 18" UND MCP497G

## (undated) DEVICE — SU SILK 2-0 FS-1 18" 685G

## (undated) DEVICE — SYR BULB IRRIG DOVER 60 ML LATEX FREE 67000

## (undated) DEVICE — SUTURE VICRYL+ 3-0 18 SH/CR UND VCP864

## (undated) DEVICE — PLATE GROUNDING ADULT W/CORD 9165L

## (undated) DEVICE — SPONGE LAP 18X18" X8435

## (undated) DEVICE — STPL SKIN 35W 6.9MM  PXW35

## (undated) DEVICE — DRESSING XEROFORM PETROLATUM 5X9 33605

## (undated) DEVICE — SUTURE MONOCRYL+ 4-0 PS-2 27IN MCP426H

## (undated) DEVICE — DRSG DRAIN 4X4" 7086

## (undated) DEVICE — HOLDER DRAINAGE BULB 0814-8220

## (undated) DEVICE — SU STRATAFIX MONOCRYL 4-0 SPIRAL PS-2 SXMP1B118

## (undated) DEVICE — ESU PENCIL SMOKE EVAC W/ROCKER SWITCH 0703-047-000

## (undated) DEVICE — Device

## (undated) DEVICE — BINDER ABD 12IN 4 PNL ELC CNTCT CLSR PRCR 62-74IN 79-89220

## (undated) DEVICE — SYR 10ML FINGER CONTROL W/O NDL 309695

## (undated) DEVICE — SUCTION TIP YANKAUER W/O VENT K86

## (undated) DEVICE — SOL NACL 0.9% IRRIG 1000ML BOTTLE 2F7124

## (undated) DEVICE — TUBING INFUSION INFILTRATION LIPOSUCTION 156" 24-6008

## (undated) DEVICE — SU PROLENE 1 CTX 30" 8455H

## (undated) DEVICE — PREP CHLORAPREP 26ML TINTED HI-LITE ORANGE 930815

## (undated) DEVICE — BLADE KNIFE SURG 15 371115

## (undated) DEVICE — ADH LIQUID MASTISOL TOPICAL VIAL 2-3ML 0523-48

## (undated) DEVICE — GLOVE GAMMEX NEOPRENE ULTRA SZ 7.5 LF 8515

## (undated) DEVICE — GLOVE BIOGEL PI INDICATOR 8.0 LF 41680

## (undated) DEVICE — CUSTOM PACK GEN MAJOR SBA5BGMHEA

## (undated) DEVICE — GOWN XLG DISP 9545

## (undated) DEVICE — SU MONOCRYL+ 4-0 18IN PS2 UND MCP496G

## (undated) DEVICE — GLOVE UNDER INDICATOR PI SZ 7.0 LF 41670

## (undated) RX ORDER — LIDOCAINE HYDROCHLORIDE 10 MG/ML
INJECTION, SOLUTION EPIDURAL; INFILTRATION; INTRACAUDAL; PERINEURAL
Status: DISPENSED
Start: 2023-05-01

## (undated) RX ORDER — FENTANYL CITRATE 50 UG/ML
INJECTION, SOLUTION INTRAMUSCULAR; INTRAVENOUS
Status: DISPENSED
Start: 2023-05-01

## (undated) RX ORDER — PROPOFOL 10 MG/ML
INJECTION, EMULSION INTRAVENOUS
Status: DISPENSED
Start: 2023-05-01

## (undated) RX ORDER — ONDANSETRON 2 MG/ML
INJECTION INTRAMUSCULAR; INTRAVENOUS
Status: DISPENSED
Start: 2023-05-01

## (undated) RX ORDER — DEXAMETHASONE SODIUM PHOSPHATE 10 MG/ML
INJECTION, SOLUTION INTRAMUSCULAR; INTRAVENOUS
Status: DISPENSED
Start: 2023-05-01

## (undated) RX ORDER — CEFAZOLIN SODIUM 1 G/3ML
INJECTION, POWDER, FOR SOLUTION INTRAMUSCULAR; INTRAVENOUS
Status: DISPENSED
Start: 2023-05-01

## (undated) RX ORDER — GINSENG 100 MG
CAPSULE ORAL
Status: DISPENSED
Start: 2023-05-01

## (undated) RX ORDER — FENTANYL CITRATE-0.9 % NACL/PF 10 MCG/ML
PLASTIC BAG, INJECTION (ML) INTRAVENOUS
Status: DISPENSED
Start: 2023-05-01